# Patient Record
Sex: MALE | Race: BLACK OR AFRICAN AMERICAN | NOT HISPANIC OR LATINO | Employment: OTHER | URBAN - METROPOLITAN AREA
[De-identification: names, ages, dates, MRNs, and addresses within clinical notes are randomized per-mention and may not be internally consistent; named-entity substitution may affect disease eponyms.]

---

## 2017-01-16 ENCOUNTER — LAB VISIT (OUTPATIENT)
Dept: LAB | Facility: HOSPITAL | Age: 47
End: 2017-01-16
Payer: MEDICARE

## 2017-01-16 DIAGNOSIS — Z76.82 AWAITING ORGAN TRANSPLANT STATUS: ICD-10-CM

## 2017-01-16 PROCEDURE — 86829 HLA CLASS I/II ANTIBODY QUAL: CPT | Mod: 91,PO

## 2017-01-16 PROCEDURE — 86829 HLA CLASS I/II ANTIBODY QUAL: CPT | Mod: PO

## 2017-01-21 LAB — HPRA INTERPRETATION: NORMAL

## 2017-02-13 ENCOUNTER — LAB VISIT (OUTPATIENT)
Dept: LAB | Facility: HOSPITAL | Age: 47
End: 2017-02-13
Payer: MEDICARE

## 2017-02-13 DIAGNOSIS — Z76.82 AWAITING ORGAN TRANSPLANT STATUS: ICD-10-CM

## 2017-02-13 PROCEDURE — 86829 HLA CLASS I/II ANTIBODY QUAL: CPT | Mod: 91,PO

## 2017-02-13 PROCEDURE — 86829 HLA CLASS I/II ANTIBODY QUAL: CPT | Mod: PO

## 2017-02-23 LAB — HPRA INTERPRETATION: NORMAL

## 2017-03-21 PROCEDURE — 86832 HLA CLASS I HIGH DEFIN QUAL: CPT | Mod: PO

## 2017-03-27 LAB
CLASS I ANTIBODIES - LUMINEX: NORMAL
CPRA %: 0
SERUM COLLECTION DT - LUMINEX CLASS I: NORMAL
SPCL1 TESTING DATE: NORMAL

## 2017-04-30 DIAGNOSIS — Z76.82 ORGAN TRANSPLANT CANDIDATE: Primary | ICD-10-CM

## 2017-04-30 NOTE — PROGRESS NOTES
YEARLY LIST MANAGEMENT NOTE    Brett Frye's kidney transplant listing status reviewed.  Patient is due for follow-up appointments in June 2017.   Appointments will be scheduled per protocol.  HLA sample is past due at this time with sample last being received on March 8, 2017

## 2017-06-22 ENCOUNTER — LAB VISIT (OUTPATIENT)
Dept: LAB | Facility: HOSPITAL | Age: 47
End: 2017-06-22
Payer: MEDICARE

## 2017-06-22 DIAGNOSIS — Z76.82 AWAITING ORGAN TRANSPLANT STATUS: ICD-10-CM

## 2017-06-22 PROCEDURE — 86829 HLA CLASS I/II ANTIBODY QUAL: CPT | Mod: PO,TXP

## 2017-06-22 PROCEDURE — 86829 HLA CLASS I/II ANTIBODY QUAL: CPT | Mod: 91,PO,TXP

## 2017-07-11 NOTE — LETTER
IMPORTANT      July 12, 2017    Brett Frye JrJorge Luis  [address] [address]     Re: [id number]    Dear Mr/Mrs Frye,    Thank you for choosing [facility] as your health care provider.  We are committed to assisting you with timely insurance filing and payment of your account.  To protect your liability, updated insurance information must be given to us at the time of service and we should be notified immediately if      · Your insurance benefits/plan changes.   You become eligible for any other benefits   Your current plan/coverage terms.    Also, please bring in a copy of your insurance premium payment if you have one of the following types of insurance:    · Coverage from a correction plan.  · Coverage from the Affordable Healthcare Act Plan.  · Coverage from a COBRA plan  · Premium paid by the National Kidney Foundation.    To ensure we have the correct insurance (Medical/Pharmacy) on file and to answer any questions regarding your benefits, please call us at [phone] or [phone] and ask to speak to the kidney  indicated below:      Transplant Dept  Cain Solis   Heart   Lilibeth Brunsonmigeronimo   Kidney (A-K) and Lung  Andrewfiona Chinchillacaesar    Kidney (L-Z)  Tamiko Oconnell   Liver    We look forward to hearing from you soon.    Sincerely,      Transplant Financial Services

## 2017-07-11 NOTE — Clinical Note
July 11, 2017        Brett Frye  [address] [address]         Dear Brett Frye:    As part of our commitment to share important information with our transplant patients, we want to provide you with the most current kidney and kidney/pancreas transplant outcomes at the [facility] as published bi-annually by the Scientific Registry for Organ Recipients (SRTR).    For kidney transplants performed between 1/1/2014 and 6/30/2016 the 1-year patient and graft survival rates are as follows:   Kidney-Cadaveric Donor Kidney-Living Donor Kidney/Pancreas   Adults [facility] 1-Year Expected 1-Year National 1-Year [facility] 1-Year Expected 1-Year National 1-Year [facility] 1-Year Expected 1-Year National 1-Year   Graft Survival 93.09% 94.31% 93.92% 97.83% 97.54% 97.75% 98.18% 96.73% 96.15%   Patient Survival 96.56% 96.91% 96.57% 100% 98.88% 98.83% 98.18% 97.52% 97.54%         To learn more about transplant outcomes in the [place] you can go to www.srtr.org.     Please call the Kidney Transplant Office at [phone] or [phone] should you have any questions or if:     Your insurance changes in any way   Your address or phone number changes   There are changes in your health   You are in the hospital or Emergency Room for any reason      Sincerely,  Kidney Transplant Team

## 2017-07-12 NOTE — ADDENDUM NOTE
Encounter addended by: Sho Sevilla on: 7/12/2017  3:38 PM<BR>    Actions taken: Letter status changed

## 2017-08-01 DIAGNOSIS — Z76.82 ORGAN TRANSPLANT CANDIDATE: ICD-10-CM

## 2017-08-03 LAB — HPRA INTERPRETATION: NORMAL

## 2017-08-17 ENCOUNTER — LAB VISIT (OUTPATIENT)
Dept: LAB | Facility: HOSPITAL | Age: 47
End: 2017-08-17
Payer: MEDICARE

## 2017-08-17 DIAGNOSIS — Z76.82 AWAITING ORGAN TRANSPLANT STATUS: ICD-10-CM

## 2017-08-17 PROCEDURE — 86832 HLA CLASS I HIGH DEFIN QUAL: CPT | Mod: PO,TXP

## 2017-08-17 PROCEDURE — 86833 HLA CLASS II HIGH DEFIN QUAL: CPT | Mod: PO,TXP

## 2017-09-11 LAB — HPRA INTERPRETATION: NORMAL

## 2017-09-19 LAB
CLASS I ANTIBODIES - LUMINEX: NEGATIVE
CLASS II ANTIBODIES - LUMINEX: NEGATIVE
CPRA %: 0
SERUM COLLECTION DT - LUMINEX CLASS I: NORMAL
SERUM COLLECTION DT - LUMINEX CLASS II: NORMAL
SPCL1 TESTING DATE: NORMAL
SPCL2 TESTING DATE: NORMAL
SPCLU TESTING DATE: NORMAL

## 2017-09-21 PROBLEM — N18.6 ESRD (END STAGE RENAL DISEASE): Status: ACTIVE | Noted: 2017-09-21

## 2017-11-02 ENCOUNTER — HOSPITAL ENCOUNTER (OUTPATIENT)
Dept: RADIOLOGY | Facility: HOSPITAL | Age: 47
Discharge: HOME | End: 2017-11-02
Attending: INTERNAL MEDICINE
Payer: MEDICARE

## 2017-11-02 ENCOUNTER — HOSPITAL ENCOUNTER (OUTPATIENT)
Dept: CARDIOLOGY | Facility: CLINIC | Age: 47
Discharge: HOME | End: 2017-11-02
Payer: MEDICARE

## 2017-11-02 ENCOUNTER — OFFICE VISIT (OUTPATIENT)
Dept: TRANSPLANT | Facility: CLINIC | Age: 47
End: 2017-11-02
Payer: MEDICARE

## 2017-11-02 VITALS
WEIGHT: 149.5 LBS | SYSTOLIC BLOOD PRESSURE: 151 MMHG | RESPIRATION RATE: 20 BRPM | HEIGHT: 71 IN | OXYGEN SATURATION: 100 % | DIASTOLIC BLOOD PRESSURE: 93 MMHG | BODY MASS INDEX: 20.93 KG/M2 | TEMPERATURE: 98 F | HEART RATE: 70 BPM

## 2017-11-02 DIAGNOSIS — Z99.2 ESRD ON PERITONEAL DIALYSIS: Primary | Chronic | ICD-10-CM

## 2017-11-02 DIAGNOSIS — N18.9 ANEMIA OF RENAL DISEASE: Chronic | ICD-10-CM

## 2017-11-02 DIAGNOSIS — N18.6 ESRD (END STAGE RENAL DISEASE): ICD-10-CM

## 2017-11-02 DIAGNOSIS — Q63.1 HORSESHOE KIDNEY: Chronic | ICD-10-CM

## 2017-11-02 DIAGNOSIS — Z76.82 ORGAN TRANSPLANT CANDIDATE: ICD-10-CM

## 2017-11-02 DIAGNOSIS — I10 ESSENTIAL HYPERTENSION: Chronic | ICD-10-CM

## 2017-11-02 DIAGNOSIS — N18.6 ESRD ON PERITONEAL DIALYSIS: Primary | Chronic | ICD-10-CM

## 2017-11-02 DIAGNOSIS — Z76.82 AWAITING ORGAN TRANSPLANT STATUS: ICD-10-CM

## 2017-11-02 DIAGNOSIS — D63.1 ANEMIA OF RENAL DISEASE: Chronic | ICD-10-CM

## 2017-11-02 LAB
AORTIC VALVE REGURGITATION: ABNORMAL
DIASTOLIC DYSFUNCTION: YES
ESTIMATED PA SYSTOLIC PRESSURE: 27.04
MITRAL VALVE REGURGITATION: ABNORMAL
RETIRED EF AND QEF - SEE NOTES: 40 (ref 55–65)
TRICUSPID VALVE REGURGITATION: ABNORMAL

## 2017-11-02 PROCEDURE — 99999 PR PBB SHADOW E&M-EST. PATIENT-LVL III: CPT | Mod: PBBFAC,TXP,, | Performed by: INTERNAL MEDICINE

## 2017-11-02 PROCEDURE — 93306 TTE W/DOPPLER COMPLETE: CPT | Mod: PBBFAC,TXP | Performed by: INTERNAL MEDICINE

## 2017-11-02 PROCEDURE — 76770 US EXAM ABDO BACK WALL COMP: CPT | Mod: TC,TXP

## 2017-11-02 PROCEDURE — 99213 OFFICE O/P EST LOW 20 MIN: CPT | Mod: PBBFAC,25,TXP | Performed by: INTERNAL MEDICINE

## 2017-11-02 PROCEDURE — 99215 OFFICE O/P EST HI 40 MIN: CPT | Mod: S$PBB,TXP,, | Performed by: INTERNAL MEDICINE

## 2017-11-02 PROCEDURE — 76770 US EXAM ABDO BACK WALL COMP: CPT | Mod: 26,GC,TXP, | Performed by: RADIOLOGY

## 2017-11-02 NOTE — LETTER
November 2, 2017        Susie Patel  [address] [address]  Phone: [phone]  Fax: [phone]             [facility]  [address]  [facility] [address]  Phone: [phone]   Patient: Brett Frye Jr.   MR Number: [id number]   YOB: 1970   Date of Visit: 11/2/2017       Dear Dr. Susie Patel    Thank you for referring Brett Frye to me for evaluation. Attached you will find relevant portions of my assessment and plan of care.    If you have questions, please do not hesitate to call me. I look forward to following Brett Frye along with you.    Sincerely,    Gina Nixon MD    Enclosure    If you would like to receive this communication electronically, please contact [email or web address] or [phone] to request [employer] Link access.    [employer] Link is a tool which provides read-only access to select patient information with whom you have a relationship. Its easy to use and provides real time access to review your patients record including encounter summaries, notes, results, and demographic information.    If you feel you have received this communication in error or would no longer like to receive these types of communications, please e-mail [email or web address]

## 2017-11-02 NOTE — PROGRESS NOTES
This low EF is a change compared with last year. Not to be called for kidney transplant until this is addressed with cardiology and further evaluation takes place.

## 2017-11-02 NOTE — LETTER
Date: 11/13/2017          Listed Patient      To: Dialysis Unit  and Charge RN From:Tiffanie Vigil LCSW    RE: Brett Uday Roman, 1970, [id number]     At [facility], we conduct adherence checks as an important part of transplant care. Initial and listed patient assessments are not complete without adherence information.        Please complete the following information:     Current Dry Weight: ___________         Most Recent Pre-Treatment Weight: ___________ /date: _________                    Data from the last 1-3 months:  (data from last 3 months preferred):    Number of AMAs with dates, time, and reasons: ____________________________________________________    ______________________________________________________________________________________________    ______________________________________________________________________________________________    Number of No-Shows with dates and reasons: ______________________________________________________      ______________________________________________________________________________________________    Last intact PTH:  ___________/date: __________      Any concerns with Labs:  YES / NO      If yes, please explain:  ___________________________________________________________________________    ______________________________________________________________________________________________    Any concerns with Caregivers:  YES / NO    If yes, please explain:  ___________________________________________________________________________    ______________________________________________________________________________________________     Any concerns with Transportation:  YES / NO    If yes, please explain:  ___________________________________________________________________________    ______________________________________________________________________________________________    Any Psychiatric and/or Psychosocial concerns:   YES / NO     If yes, please explain: ___________________________________________________________________________    ______________________________________________________________________________________________      PLEASE RETURN TO: FAX: [phone]     Thank you for collaborating with us in the care of this patient.           [address]  ?  [place] [address]  ?  phone [phone]  ?  fax [phone]  ?  [email or web address]  Confidentiality notice: The accompanying facsimile is intended solely for the use of the recipient designated above. Document(s) transmitted herewith may contain information that is confidential and privileged. Delivery, distribution or dissemination of this communication other than to the intended recipient is strictly prohibited. If you have received this facsimile in error, please notify us immediately by telephone.

## 2017-11-02 NOTE — PATIENT INSTRUCTIONS
1. We will put you on hold for now; until we can get your heart checked out   2. Try to increase your activity   3. Have any potential donors contact the living donor coordinator

## 2017-11-02 NOTE — PROGRESS NOTES
Kidney Transplant Recipient Reevalulation    Referring Physician: Susie Patel  Current Nephrologist: Susie Patel  Waitlist Status: active  Dialysis Start Date: 10/3/2017    Subjective:     CC:  Annual reassessment of kidney transplant candidacy.    HPI:  Mr. Frye is a 47 y.o. 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 Patient was on peritoneal dialysis from 6/14/12 until Sept 2017 - states that he wasn't getting adequate dialysis. He had peritonitis a week after his last transplant clinic visit which was his initial RR clinic visit on 2/16/16. He has been on the wait list for a kidney transplant at [facility] since 6/14/2012. Patient is currently on hemodialysis started on Sept 2017. Patient is dialyzing on TTS schedule.  Patient reports that he is tolerating dialysis well.. He has a dialysis catheter. Patient denies any recent hospitalizations or ED visits. He states home BPs are mostly in the 150/90s.     He will walk to his mother's house which is half a mile away every 2-3 days.     He had TTE today which showed a decrease in LVEF from 55% (6/27/16) --> 40% today. Discussed with patient how we will need him to see cardiology and will place him on hold on the waitlist until this is further evaluated.     Review of Systems   Constitutional: Denies fever/chills, fatigue, night sweats  EENT: Denies vision problems, hearing problems, trouble swallowing.   Respiratory: Denies shortness of breath, dyspnea on exertion, orthopnea, wheezing, hemoptysis, denies known TB exposure or history of positive TB skin test  Cardiovascular: Denies chest pain, palpitations, history of MI, history of stroke, history of DVT  Gastrointestinal: Denies abdominal pain, nausea/vomiting/diarrhea/constipation. Denies history of GI bleeding or ulcers.   Genitourinary: +residual UOP ~2 cups/day; Denies history of kidney stones, recurrent UTI's, history of urinary obstruction,  hematuria, dysuria, urinary frequency, incontinence  Musculoskeletal: Denies trouble moving extremities, denies joint pain or swelling  Skin: Denies history of skin cancer, denies rash, ulcerations  Heme/onc: Denies any history of cancer, denies history of coagulopathies or bleeding disorders  Endocrine: +gained 8 lbs since Feb 2016 transplant clinic visit; Denies thyroid disease  Neurological: Denies tremors, seizures, dizziness, headaches, peripheral neuropathy  Psychiatric: Denies anxiety, depression. Denies hallucinations or delusions.      Medications:   Current Outpatient Prescriptions   Medication Sig Dispense Refill    allopurinol (ZYLOPRIM) 100 MG tablet Take 100 mg by mouth once daily.      amlodipine (NORVASC) 10 MG tablet Take 10 mg by mouth once daily.      atorvastatin (LIPITOR) 10 MG tablet Take 10 mg by mouth every evening.       B complex-vitamin C-folic acid 0.8 mg Tab Take 1 tablet by mouth once daily.      calcitRIOL (ROCALTROL) 0.25 MCG Cap Take 0.25 mcg by mouth once daily.      carvedilol (COREG) 6.25 MG tablet Take 6.25 mg by mouth 2 (two) times daily.      doxazosin (CARDURA) 1 MG tablet Take 1 mg by mouth once daily.       FERRIC CITRATE (AURYXIA ORAL) Take 2 tablets by mouth 3 (three) times daily.      ferric citrate 210 mg iron Tab Take 210 mg by mouth 3 (three) times daily.      furosemide (LASIX) 80 MG tablet Take 80 mg by mouth once daily.      hydrALAZINE (APRESOLINE) 25 MG tablet Take 25 mg by mouth 3 (three) times daily.      hydrocodone-acetaminophen 5-325mg (NORCO) 5-325 mg per tablet Take 1 tablet by mouth every 6 (six) hours as needed for Pain. 15 tablet 0    isosorbide dinitrate (ISORDIL) 20 MG tablet Take 20 mg by mouth 3 (three) times daily.      k phos di & mono-sod phos mono (K-PHOS-NEUTRAL) 250 mg Tab Take 500 mg by mouth 3 (three) times daily after meals.      lanthanum (FOSRENOL) 1000 MG chewable tablet Take 1,000 mg by mouth 3 (three) times daily.       "multivitamin with minerals tablet Take 1 tablet by mouth once daily.      sevelamer carbonate (RENVELA) 800 mg Tab Take 800 mg by mouth 3 (three) times daily with meals.      sodium bicarbonate 650 MG tablet Take 650 mg by mouth 2 (two) times daily.       No current facility-administered medications for this visit.      *not taking Norco - prescribed after PD catheter placed  *doesn't take Auryxia or KPN    Objective:   body mass index is 21.14 kg/m².   BP (!) 151/93 (BP Location: Right arm, Patient Position: Sitting, BP Method: Medium (Automatic))   Pulse 70   Temp 97.8 °F (36.6 °C) (Oral)   Resp 20   Ht 5' 10.5" (1.791 m)   Wt 67.8 kg (149 lb 7.6 oz)   SpO2 100%   BMI 21.14 kg/m²       Physical Exam   General: No acute distress, well groomed, alert and oriented x 3  HEENT: Normocephalic, atraumatic, PERRLA, sclera anicteric, conjunctiva/corneas clear, EOM's intact bilaterally, external inspection of ears and nose normal, moist mucous membranes, no oral ulcerations/lesions; multiple missing teeth; some areas concerning for decay  Neck: Supple, symmetrical, trachea midline, no masses, no carotid bruits, no JVD, thyroid is normal without nodules or enlargement   Respiratory: Clear to auscultation bilaterally, respirations unlabored, no rales/rhonchi/wheezing   Cardiovacular: Regular rate and rhythm, +murmur, no rubs or gallops   Gastrointestinal: Soft, non-tender, bowel sounds normal, no masses, no palpable organomegaly  Extremities: No clubbing or cyanosis of upper extremities bilaterally, no pedal edema bilaterally; +2 bilateral radial pulses  Skin: warm and dry; no rash on exposed skin  Lymph nodes: ?left cervical lymphadenopathy; supraclavicular nodes normal   Neurologic: No focal neurologic deficits.   Musculoskeletal: moves all extremities without difficulty, FROM, 5/5 strength, ambulates without an assistive device  Psychiatric: Normal mood and affect. Responds appropriately to " questions.      Labs:  Lab Results   Component Value Date    WBC 9.58 02/16/2016    HGB 12.1 (L) 09/21/2017    HCT 39.1 (L) 02/16/2016     09/21/2017    K 4.9 09/21/2017     09/21/2017    CO2 27 09/21/2017    BUN 22 (H) 09/21/2017    CREATININE 7.77 (H) 09/21/2017    EGFRNONAA 8 (A) 09/21/2017    CALCIUM 9.5 09/21/2017    PHOS 8.0 (H) 02/16/2016    ALBUMIN 3.7 02/16/2016    AST 11 02/16/2016    ALT 13 02/16/2016    .0 (H) 06/27/2016       No results found for: PREALBUMIN, BILIRUBINUA, GGT, AMYLASE, LIPASE, PROTEINUA, NITRITE, RBCUA, WBCUA    No results found for: HLAABCTYPE    Lab Results   Component Value Date    CPRA 0 08/11/2017    FP7RKEK Negative 08/11/2017    CIABCLM WEAK B82, A80 09/04/2016    CIIAB Negative 08/11/2017       Labs were reviewed with the patient.    Pre-transplant Workup:   Reviewed with the patient.    Assessment:     1. ESRD on dialysis since 6/14/12    2. Anemia of renal disease    3. Essential hypertension    4. Horseshoe kidney    5. ESRD (end stage renal disease)    6. Awaiting organ transplant status        Plan:     Transplant Candidacy:   Mr. Frye is an unacceptable kidney transplant candidate at this time given decrease in LVEF which warrants cardiology evaluation.  He will be placed in an inactive status at present due to drop in LVEF. . Need to refer to cardiology for consult.     Exercise: reminded Brett of the importance of regular exercise for weight management, blood sugar and blood pressure management.  I also explained exercise has been shown to improve cardiovascular health, energy level, and sleep hygiene.  Lastly, I advised him that cardiovascular complications are leading cause of death and regular exercise can help lower this risk.      Gina Nixon MD       Follow-up:   In addition to the tests noted in the plan, Mr. Frye will continue to have reevaluation as per the standing pre-kidney transplant protocol:  1. Monthly blood for PRA  2. Annual  return to clinic, except HIV positive, > 65 years of age, or pancreas transplant candidates who will be scheduled to see transplant every 6 months while in pre-transplant phase  3. Annual re-testing: CXR, EKG, yearly mammograms for women over 40 and PSA for males over 40, cardiology follow-up as recommended by initial cardiology pre-transplant evaluation  4. Renal ultrasound every 2 years  5. Baseline colonoscopy after age 50 and repeated as recommended    UNOS Patient Status  Functional Status: 60% - Requires occasional assistance but is able to care for needs  Physical Capacity: No Limitations

## 2017-11-03 NOTE — PROGRESS NOTES
Pt status changed to inactive due to decreased EF, needs cards eval & clr. Status changed in [facility] & [place]. Letter request in [facility].

## 2017-11-03 NOTE — LETTER
November 3, 2017          Brett Frye  [address] [address]          Dear Brettferoz Frye:  MRN: [id number]    The [facility] team reviewed your transplant candidacy.  It is our programs opinion that you are temporarily not a transplant candidate at our facility as of 11/3/17 because of decreased ejection fraction requiring Cardiology evaluation.  You will remain listed on the wait list, but will not be able to receive a transplant until this issue is resolved.      Attached is a letter from the United Network for Organ Sharing (UNOS).  It describes the services and information offered to patients by UNOS and the Organ Procurement and Transplant Network.    The [facility] team remains available to answer any questions.  Should you have any questions regarding this decision, please do not hesitate to contact our pre-transplant office.      Sincerely,        Halle Billy MD  Medical Director, Kidney & Kidney/Pancreas Transplantation  lh  Mailed to patient    Faxed to:  Susie Patel MD  [facility]              OPTN/UNOS: Your Resource for Organ Transplant Information        If you have a question regarding your own medical care, you always should call your transplant center first. However, for general organ transplant-related information, you can call the United Network for Organ Sharing (UNOS) toll-free patient services line at [phone].    Anyone, including potential transplant candidates, recipients, family members/friends, living donors, and/or donor family members can call this number to:    · talk about organ donation, living donation, how transplant and donation work, the donation process, transplant policies, and transplant/donor information;  · get a free patient information kit with helpful booklets, waiting list and transplant information, and a list of all transplant centers;  · ask questions about the Organ Procurement and  Transplantation Network (OPTN) web site (www.optn.transplant.hrsa.gov); the UNOS Web site (www.unos.org); or the UNOS web site for living donors and transplant recipients (www.transplantliving.org);  · learn how UNOS and the OPTN can help you;  · talk about any concerns that you may have with a transplant center and how they perform    [facility] is a not-for-profit organization that provides all of the administrative services for the national OPTN under federal contract to the Health Resources and Services Administration (HRSA), an agency under the [place].S. Department of Health and Human Services (HHS).     UNOS and OPTN responsibilities include:    · writing educational material for patients, the public and professionals;  · helping to make people aware of the need for donated organs and tissue;  · writing organ transplant policy with help from doctors, nurses, transplant patients/candidates, donor families, living donors, and the public;  · coordinating the organ matching and placement process;  · collecting information about every organ transplant and donation that occurs in the [place].    Remember, you should contact your transplant center directly if you have questions or concerns about your own medical care including medical records, work-up progress and test reports. [facility] is not your transplant center, and staff at [facility] will not be able to transfer you to your transplant center, so keep your transplant centers phone number handy. But, while you research your transplant needs and learn as much as you can about transplantation and donation, we welcome your call to our toll-free patient services line at [phone].

## 2017-11-13 NOTE — PROGRESS NOTES
Transplant Recipient Adult Psychosocial Assessment (Last Assessment completed on 02/23/2016)    Brett Frye  [address] [facility] [address]    Telephone Information:   Mobile [phone]   Home  [phone] (home)  Work  There is no work phone number on file.  E-mail  No e-mail address on record    Sex: male  YOB: 1970  Age: 47 y.o.    Encounter Date: 11/2/2017  [place] Citizen: yes  Primary Language: English   Needed: no    Emergency Contact:  Name: Halle Frye  Relationship: mother  Address: Same as pt  Phone Numbers:  [phone] (home), [phone] (mobile)    Name: Arjun Grant  Relationship: significant other   Address: Same as pt.  Phone Numbers:  [phone] (mobile)    Family/Social Support:   Number of dependents/: Pt reports no children of his own. Pt reports pt's significant other has 2 minor children. Pt reports mother and significant other will rotate with caring for pt and children.  Marital history: Pt reports being currently  from wife: Lilia. Pt reports they were  in 1995 and have been  since 2013/2014. Pt reports current significant other as: Dewayne Grant and reports they have been together since 2013/2014.  Other family dynamics: Pt reports parents: Brett and Halle Frye and 1 sister: Ryley. Pt identifies all family members as supportive.    Household Composition:  Name: Brett Frye Jr.  Age: 47  Relationship: patient  Does person drive? yes    Name: Arjun Grant  Age: 36  Relationship: significant other  Does person drive? yes    Name: Radha Morris and Sunshine Grant  Age: 13 and 7 (respectively)  Relationship: significant other's children  Does person drive? yes    Do you and your caregivers have access to reliable transportation? yes  PRIMARY CAREGIVER: Dewayne (pronunced: Car-edwin-thee-a) Rudy (significant other) will be primary caregiver, phone number [phone].       provided in-depth information to patient and caregiver regarding pre- and post-transplant caregiver role.   strongly encourages patient and caregiver to have concrete plan regarding post-transplant care giving, including back-up caregiver(s) to ensure care giving needs are met as needed.    Patient and Caregiver states understanding all aspects of caregiver role/commitment and is able/willing/committed to being caregiver to the fullest extent necessary.    Patient and Caregiver verbalizes understanding of the education provided today and caregiver responsibilities.         remains available. Patient agree to contact  in a timely manner if concerns arise.      Able to take time off work without financial concerns: yes.     Natty reports that her children will be cared for by her mother: Karen Grant (62), [phone], lives in [place], [place], drives    Additional Significant Others who will Assist with Transplant:  Name: Halle Barker  Age: 65  Phone: [phone]  City: [place] State: [place]  Relationship: mother  Does person drive? yes    Living Will: no Education and information provided to pt. SW also strongly encouraged pt to complete forms due to pt's current marital status.   Healthcare Power of : no Education and information provided to pt. SW also strongly encouraged pt to complete forms due to pt's current marital status.     Patient and Caregiver verbalized understanding and agreement.     Advance Directives on file: <<no information> per medical record.  Verbally reviewed LW/HCPA information.   provided patient with copy of LW/HCPA documents and provided education on completion of forms.    Living Donors: Yes.  Name: sister: Ryley Charles or significant other: Arjun Grant. Education and resource information given to patient. SW explained to Patient and Caregiver that both recipients and donors require separate caregivers. Patient  and Caregiver verbalized understanding and agreement.     Highest Education Level: High School (9-12) or GED  Reading Ability: 12th grade  Reports difficulty with: seeing and wears reading glasses  Learns Best By:  Pt reports pt learns best by a combination of verbal, written, and tactile instructions.     Status: no  VA Benefits: no     Working for Income: No  If no, reason not working: Disability  Patient is disabled.  Prior to disability, patient  was employed as a  for [employer] in ..    Spouse/Significant Other Employment: Pt reports Dewayne does not currently work.    Disabled: yes: date disability began: 2015, due to: ESRD.    Monthly Income:  SS Disability: $1200     Able to afford all costs now and if transplanted, including medications: yes Pt reports that he has raised $4000 from doing suppers. Pt also plans to return to work.   Patient and Caregiver verbalizes understanding of personal responsibilities related to transplant costs and the importance of having a financial plan to ensure that patients transplant costs are fully covered.       provided fundraising information/education. Patient and Caregiververbalizes understanding.   remains available.    Insurance:   Payor/Plan Subscr  Sex Relation Sub. Ins. ID Effective Group Num   1. MEDICARE - [place]* JORDYN LOPEZ . 1970 Male  [id number]                                    [address]     Primary Insurance (for UNOS reporting): Public Insurance - Medicare FFS (Fee For Service)  Secondary Insurance (for UNOS reporting): None Pt does have a Part D with [employer]  Patient and Caregiver verbalizes clear understanding that patient may experience difficulty obtaining and/or be denied insurance coverage post-surgery. This includes and is not limited to disability insurance, life insurance, health insurance, burial insurance, long term care insurance, and other  insurances.      Patient and Caregiver also reports understanding that future health concerns related to or unrelated to transplantation may not be covered by patient's insurance.  Resources and information provided and reviewed.     Patient and Caregiver provides verbal permission to release any necessary information to outside resources for patient care and discharge planning.  Resources and information provided are reviewed.      Dialysis Adherence: Patient reports being on hemodialysis in center attending all dialysis appointments and staying for the entire course of treatment. SW faxed an adherence form (see Letters section) and is awaiting a fax back.       Infusion Service: patient utilizing? no  Home Health: patient utilizing? no  DME: yes BP Cuff  Pulmonary/Cardiac Rehab: Pt denies   ADLS:  Pt reports no difficulties with driving, walking, bathing, cooking, housekeeping, eating, shopping, and taking medication.    Adherence:   Pt reports good adherence with medications, dialysis, and health regimen.  Adherence education and counseling provided.     Per History Section:  Past Medical History:   Diagnosis Date    Anemia of renal disease     ESRD on dialysis since 6/14/12     Essential hypertension     Horseshoe kidney     Secondary hyperparathyroidism of renal origin      Social History   Substance Use Topics    Smoking status: Current Every Day Smoker     Packs/day: 1.00     Types: Cigarettes    Smokeless tobacco: Never Used      Comment: started smoking at age 20; at the most smoked 1 ppd; currently smoking 10 or less cig/day    Alcohol use No      Comment: previously would drink a 12 pack of beer on the weekends; quit 2 years ago     History   Drug use: Unknown     Comment: remote marijuana use in his 20s     History   Sexual Activity    Sexual activity: Not on file       Per Today's Psychosocial:  Tobacco: Pt reports he currently smokes about 0.5 ppd. Pt reports that he has smoked up to 1ppd. Pt  reports he has smoked for 25 years..  Alcohol: Pt reports that he used to drink about a 12 pack on the weekend and reports quitting in 2014..  Illicit Drugs/Non-prescribed Medications: Pt reports a remote history of marijuana use in his 20s. Pt reports no current use..    Patient and Caregiver states clear understanding of the potential impact of substance use as it relates to transplant candidacy and is aware of possible random substance screening.  Substance abstinence/cessation counseling, education and resources provided and reviewed.     Arrests/DWI: Pt reports being arrested once in his 20s for a DWI traffic ticket.  Treatment/Rehab: patient denies    Psychiatric History:    Mental Health: Pt reports no history of or current mental health issues or concerns.   Psychiatrist/Counselor: Pt denies seeing a mental health professional and reports being open to seeing the psych department for talk therapy if necessary.  Medications:  Pt denies taking medications for mental health reasons.  Suicide/Homicide Issues: Pt denies any history of or current suicidal or homicidal ideations.    Safety at home: Pt reports no current or history of safety concerns in household.    Knowledge: Patient and Caregiver states having clear understanding and realistic expectations regarding the potential risks and potential benefits of organ transplantation and organ donation and agrees to discuss with health care team members and support system members, as well as to utilize available resources and express questions and/or concerns in order to further facilitate the pt informed decision-making.  Resources and information provided and reviewed.    Patient and Caregiver is aware of [facility]'s affiliation and/or partnership with agencies in home health care, LTAC, SNF, [facility], and other hospitals and clinics.    Understanding: Patient and Caregiver reports having a clear understanding of the many lifetime commitments involved with being a  transplant recipient, including costs, compliance, medications, lab work, procedures, appointments, concrete and financial planning, preparedness, timely and appropriate communication of concerns, abstinence (ETOH, tobacco, illicit non-prescribed drugs), adherence to all health care team recommendations, support system and caregiver involvement, appropriate and timely resource utilization and follow-through, mental health counseling as needed/recommended, and patient and caregiver responsibilities.  Social Service Handbook, resources and detailed educational information provided and reviewed.  Educational information provided.    Patient and Caregiver also reports current and expected compliance with health care regime and states having a clear understanding of the importance of compliance.      Patient and Caregiver reports a clear understanding that risks and benefits may be involved with organ transplantation and with organ donation.       Patient and Caregiver also reports clear understanding that psychosocial risk factors may affect patient, and include but are not limited to feelings of depression, generalized anxiety, anxiety regarding dependence on others, post traumatic stress disorder, feelings of guilt and other emotional and/or mental concerns, and/or exacerbation of existing mental health concerns.  Detailed resources provided and discussed.      Patient and Caregiver agrees to access appropriate resources in a timely manner as needed and/or as recommended, and to communicate concerns appropriately.  Patient and Caregiver also reports a clear understanding of treatment options available.     Patient and Caregiver received education in a group setting.   reviewed education, provided additional information, and answered questions.    Feelings or Concerns: Patient and Caregiver did not express any concerns at this time.    Coping: Pt reports coping well with the transplant process at this time  "and reports spending time with parents, taking a walk, and playing his Hybrid [employer] 360 as ways to cope.    Goals: Pt reports going back to work, "hoping this kidney lasts a long time" and "starting my life back over if the good Lord says the same" as goals for after transplant.  Patient referred to Vocational Rehabilitation.    Interview Behavior: Patient and Caregiver presents as alert and oriented x 4, pleasant, good eye contact, well groomed, recall good, concentration/judgement good, average intelligence, calm, communicative, cooperative and asking and answering questions appropriately. Pt presents with significant other: Sanjay Grant at pt's request.         Transplant Social Work - Candidacy  Assessment/Plan:     Psychosocial Suitability: Patient presents as a suitable candidate for kidney transplant at this time. Based on psychosocial risk factors, patient presents as low risk, due to adequate financial plan, suitable dialysis adherence, and caregiver plan in place..    Recommendations/Additional Comments: SW recommends that pt conduct fundraising to assist pt with pay for cost of medications, food, gas, and other transplant related needs. SW recommends that pt remain aware of potential mental health concerns and contact the team if any concerns arise. SW recommends that pt remain abstinent from tobacco, ETOH, and drug use. SW supports pt's continued dialysis adherence. SW remains available to answer any questions or concerns that arise as the pt moves through the transplant process.     Tiffanie Vigil LCSW         "

## 2017-11-14 ENCOUNTER — LAB VISIT (OUTPATIENT)
Dept: LAB | Facility: HOSPITAL | Age: 47
End: 2017-11-14
Payer: MEDICARE

## 2017-11-14 DIAGNOSIS — Z76.82 AWAITING ORGAN TRANSPLANT STATUS: ICD-10-CM

## 2017-11-14 PROCEDURE — 86829 HLA CLASS I/II ANTIBODY QUAL: CPT | Mod: 91,PO,TXP

## 2017-11-14 PROCEDURE — 86829 HLA CLASS I/II ANTIBODY QUAL: CPT | Mod: PO,TXP

## 2017-11-30 LAB — HPRA INTERPRETATION: NORMAL

## 2018-01-03 ENCOUNTER — OFFICE VISIT (OUTPATIENT)
Dept: CARDIOLOGY | Facility: CLINIC | Age: 48
End: 2018-01-03
Payer: MEDICARE

## 2018-01-03 VITALS
WEIGHT: 146.19 LBS | DIASTOLIC BLOOD PRESSURE: 80 MMHG | HEIGHT: 70 IN | BODY MASS INDEX: 20.93 KG/M2 | HEART RATE: 70 BPM | SYSTOLIC BLOOD PRESSURE: 120 MMHG

## 2018-01-03 DIAGNOSIS — I10 ESSENTIAL HYPERTENSION: Primary | Chronic | ICD-10-CM

## 2018-01-03 DIAGNOSIS — I42.0 DILATED CARDIOMYOPATHY: ICD-10-CM

## 2018-01-03 DIAGNOSIS — Z99.2 ESRD ON PERITONEAL DIALYSIS: Chronic | ICD-10-CM

## 2018-01-03 DIAGNOSIS — N18.6 ESRD ON PERITONEAL DIALYSIS: Chronic | ICD-10-CM

## 2018-01-03 DIAGNOSIS — N18.6 ESRD (END STAGE RENAL DISEASE): ICD-10-CM

## 2018-01-03 DIAGNOSIS — Q63.1 HORSESHOE KIDNEY: Chronic | ICD-10-CM

## 2018-01-03 DIAGNOSIS — Z01.818 PRE-TRANSPLANT EVALUATION FOR CHRONIC KIDNEY DISEASE: ICD-10-CM

## 2018-01-03 PROCEDURE — 99213 OFFICE O/P EST LOW 20 MIN: CPT | Mod: PBBFAC,TXP | Performed by: INTERNAL MEDICINE

## 2018-01-03 PROCEDURE — 99204 OFFICE O/P NEW MOD 45 MIN: CPT | Mod: S$PBB,TXP,, | Performed by: INTERNAL MEDICINE

## 2018-01-03 PROCEDURE — 99999 PR PBB SHADOW E&M-EST. PATIENT-LVL III: CPT | Mod: PBBFAC,TXP,, | Performed by: INTERNAL MEDICINE

## 2018-01-03 RX ORDER — RAMIPRIL 2.5 MG/1
2.5 CAPSULE ORAL DAILY
COMMUNITY
End: 2018-07-12

## 2018-01-03 NOTE — PROGRESS NOTES
Subjective:   Patient ID:  Brett Frye Jr. is a 47 y.o. male who presents for evaluation of Pre-op Exam      HPI: He has a history of ESRD since 2012 due to HTN and horse shoe kidney. He had been on peritoneal dialysis until this past summer. As part of his annual transplant evaluation, an echo was done which showed a drop in his LVEF from 55-60% to 40-45% associated with global hypokinesis. His LVEDD was enlarged at 5.9 cm. RV function was normal. He denies any cardiac history. Brett Frye Jr. denies any chest pain, shortness of breath, PND, orthopnea, palpitations, leg edema, or syncope. He is not physically active. He smokes cigarettes. He had a DSE done in 2016 which was negative for ischemia.    ECG (6/16): NSR 87 bpm.    Past Medical History:   Diagnosis Date    Anemia of renal disease     ESRD on dialysis since 6/14/12     Essential hypertension     Horseshoe kidney     Hyperlipidemia     Secondary hyperparathyroidism of renal origin        Past Surgical History:   Procedure Laterality Date    PERITONEAL CATHETER INSERTION  2012    x 2       Social History     Social History    Marital status:      Spouse name: N/A    Number of children: N/A    Years of education: N/A     Social History Main Topics    Smoking status: Current Every Day Smoker     Packs/day: 1.00     Years: 20.00     Types: Cigarettes    Smokeless tobacco: Never Used      Comment: started smoking at age 20; at the most smoked 1 ppd; currently smoking 10 or less cig/day    Alcohol use No      Comment: previously would drink a 12 pack of beer on the weekends; quit 2 years ago    Drug use: Unknown      Comment: remote marijuana use in his 20s    Sexual activity: Not Asked     Other Topics Concern    None     Social History Narrative    Lives with girlfriend who will be his caregiver    Doesn't work currently - used to be a  and        Family History   Problem Relation Age of Onset    Diabetes  Mother     Hypertension Mother     Diabetes Father     Hypertension Father     Coronary artery disease Father      MI in his early 60s    Heart attack Father     Kidney disease Neg Hx     Cancer Neg Hx        Patient's Medications   New Prescriptions    No medications on file   Previous Medications    ALLOPURINOL (ZYLOPRIM) 100 MG TABLET    Take 100 mg by mouth once daily.    AMLODIPINE (NORVASC) 10 MG TABLET    Take 10 mg by mouth once daily.    ATORVASTATIN (LIPITOR) 10 MG TABLET    Take 10 mg by mouth every evening.     B COMPLEX-VITAMIN C-FOLIC ACID 0.8 MG TAB    Take 1 tablet by mouth once daily.    CALCITRIOL (ROCALTROL) 0.25 MCG CAP    Take 0.25 mcg by mouth once daily.    CARVEDILOL (COREG) 6.25 MG TABLET    Take 6.25 mg by mouth 2 (two) times daily.    DOXAZOSIN (CARDURA) 1 MG TABLET    Take 1 mg by mouth once daily.     FERRIC CITRATE (AURYXIA ORAL)    Take 2 tablets by mouth 3 (three) times daily.    FERRIC CITRATE 210 MG IRON TAB    Take 210 mg by mouth 3 (three) times daily.    FUROSEMIDE (LASIX) 80 MG TABLET    Take 80 mg by mouth once daily.    HYDRALAZINE (APRESOLINE) 25 MG TABLET    Take 25 mg by mouth 3 (three) times daily.    HYDROCODONE-ACETAMINOPHEN 5-325MG (NORCO) 5-325 MG PER TABLET    Take 1 tablet by mouth every 6 (six) hours as needed for Pain.    ISOSORBIDE DINITRATE (ISORDIL) 20 MG TABLET    Take 20 mg by mouth 3 (three) times daily.    K PHOS DI & MONO-SOD PHOS MONO (K-PHOS-NEUTRAL) 250 MG TAB    Take 500 mg by mouth 3 (three) times daily after meals.    LANTHANUM (FOSRENOL) 1000 MG CHEWABLE TABLET    Take 1,000 mg by mouth 3 (three) times daily.    MULTIVITAMIN WITH MINERALS TABLET    Take 1 tablet by mouth once daily.    RAMIPRIL (ALTACE) 2.5 MG CAPSULE    Take 2.5 mg by mouth once daily.    SEVELAMER CARBONATE (RENVELA) 800 MG TAB    Take 800 mg by mouth 3 (three) times daily with meals.    SODIUM BICARBONATE 650 MG TABLET    Take 650 mg by mouth 2 (two) times daily.   Modified  "Medications    No medications on file   Discontinued Medications    No medications on file       Review of Systems   Constitution: Negative for weakness, malaise/fatigue and weight gain.   HENT: Negative for hearing loss.    Eyes: Negative for visual disturbance.   Cardiovascular: Negative for chest pain, claudication, dyspnea on exertion, leg swelling, near-syncope, orthopnea, palpitations, paroxysmal nocturnal dyspnea and syncope.   Respiratory: Negative for cough, shortness of breath, sleep disturbances due to breathing, snoring and wheezing.    Endocrine: Negative for cold intolerance, heat intolerance, polydipsia, polyphagia and polyuria.   Hematologic/Lymphatic: Negative for bleeding problem. Does not bruise/bleed easily.   Skin: Negative for rash and suspicious lesions.   Musculoskeletal: Negative for arthritis, falls, joint pain, muscle weakness and myalgias.   Gastrointestinal: Negative for abdominal pain, change in bowel habit, constipation, diarrhea, heartburn, hematochezia, melena and nausea.   Genitourinary: Negative for hematuria and nocturia.   Neurological: Negative for excessive daytime sleepiness, dizziness, headaches, light-headedness and loss of balance.   Psychiatric/Behavioral: Negative for depression. The patient is not nervous/anxious.    Allergic/Immunologic: Negative for environmental allergies.       /80   Pulse 70   Ht 5' 10" (1.778 m)   Wt 66.3 kg (146 lb 2.6 oz)   BMI 20.97 kg/m²     Objective:   Physical Exam   Constitutional: He is oriented to person, place, and time. He appears well-developed and well-nourished.        HENT:   Head: Normocephalic and atraumatic.   Mouth/Throat: Oropharynx is clear and moist.   Eyes: Conjunctivae and EOM are normal. Pupils are equal, round, and reactive to light. No scleral icterus.   Neck: Normal range of motion. Neck supple. No hepatojugular reflux and no JVD present. No tracheal deviation present. No thyromegaly present.   Cardiovascular: " Normal rate, regular rhythm, normal heart sounds and intact distal pulses.  PMI is not displaced.    Pulses:       Carotid pulses are 2+ on the right side, and 2+ on the left side.       Radial pulses are 2+ on the right side, and 2+ on the left side.        Dorsalis pedis pulses are 2+ on the right side, and 2+ on the left side.        Posterior tibial pulses are 2+ on the right side, and 2+ on the left side.   AV fistula with palpable thrill in right wrist    HD catheter present right chest   Pulmonary/Chest: Effort normal and breath sounds normal.   Abdominal: Soft. Bowel sounds are normal. He exhibits no distension and no mass. There is no hepatosplenomegaly. There is no tenderness.   Musculoskeletal: He exhibits no edema or tenderness.   Lymphadenopathy:     He has no cervical adenopathy.   Neurological: He is alert and oriented to person, place, and time.   Skin: Skin is warm and dry. No rash noted. No cyanosis or erythema. Nails show no clubbing.   Psychiatric: He has a normal mood and affect. His speech is normal and behavior is normal.       Lab Results   Component Value Date     09/21/2017    K 4.9 09/21/2017     09/21/2017    CO2 27 09/21/2017    BUN 22 (H) 09/21/2017    CREATININE 7.77 (H) 09/21/2017    GLU 91 09/21/2017    AST 11 02/16/2016    ALT 13 02/16/2016    ALBUMIN 3.7 02/16/2016    PROT 6.9 02/16/2016    BILITOT 0.4 02/16/2016    WBC 9.58 02/16/2016    HGB 12.1 (L) 09/21/2017    HCT 39.1 (L) 02/16/2016    MCV 99 (H) 02/16/2016     02/16/2016    INR 1.1 02/16/2016    TSH 3.48 06/09/2009    CHOL 123 02/16/2016    HDL 41 02/16/2016    LDLCALC 64.4 02/16/2016    TRIG 88 02/16/2016       Assessment:     1. Essential hypertension : Blood pressure is at goal. I have made no changes. Continue current regimen.   2. ESRD (end stage renal disease)    3. ESRD on dialysis since 6/14/12    4. Horseshoe kidney    5. Pre-transplant evaluation for chronic kidney disease    6. Dilated  cardiomyopathy : He has new drop in his LVEF. He is euvolemic. I have ordered a PET stress to rule out ischemic heart disease. He is on appropriate medical therapy with an ACE-I, beta blocker, hydralazine and nitrate therapy.       Plan:     Brett was seen today for pre-op exam.    Diagnoses and all orders for this visit:    Essential hypertension    ESRD (end stage renal disease)    ESRD on dialysis since 6/14/12    Horseshoe kidney    Pre-transplant evaluation for chronic kidney disease  -     Cardiac PET Scan Stress; Future    Dilated cardiomyopathy  -     Cardiac PET Scan Stress; Future        Thank you for allowing me to participate in this patient's care. Please do not hesitate to contact me with any questions or concerns.

## 2018-01-03 NOTE — LETTER
January 3, 2018      Halle Sanchez MD  [address]  [place] [address]           [facility]  [address]  [facility] [address]  Phone: [phone]          Patient: Brett Frye Jr.   MR Number: [id number]   YOB: 1970   Date of Visit: 1/3/2018       Dear Dr. Halle Sanchez:    Thank you for referring Brett Frye to me for evaluation. Attached you will find relevant portions of my assessment and plan of care.    If you have questions, please do not hesitate to call me. I look forward to following Brett Frye along with you.    Sincerely,    Claudette Turcios MD    Enclosure  CC:  No Recipients    If you would like to receive this communication electronically, please contact [email or web address] or [phone] to request more information on [employer] [facility] access.    For providers and/or their staff who would like to refer a patient to [facility], please contact us through our one-stop-shop provider referral line, [facility], at [phone].    If you feel you have received this communication in error or would no longer like to receive these types of communications, please e-mail [email or web address]

## 2018-02-05 ENCOUNTER — CLINICAL SUPPORT (OUTPATIENT)
Dept: CARDIOLOGY | Facility: CLINIC | Age: 48
End: 2018-02-05
Attending: INTERNAL MEDICINE
Payer: MEDICARE

## 2018-02-05 DIAGNOSIS — Z01.818 PRE-TRANSPLANT EVALUATION FOR CHRONIC KIDNEY DISEASE: ICD-10-CM

## 2018-02-05 DIAGNOSIS — I42.0 DILATED CARDIOMYOPATHY: ICD-10-CM

## 2018-02-05 LAB — DIASTOLIC DYSFUNCTION: NO

## 2018-02-05 PROCEDURE — 93016 CV STRESS TEST SUPVJ ONLY: CPT | Mod: S$PBB,TXP,, | Performed by: INTERNAL MEDICINE

## 2018-02-05 PROCEDURE — 78492 MYOCRD IMG PET MLT RST&STRS: CPT | Mod: 26,S$PBB,TXP, | Performed by: INTERNAL MEDICINE

## 2018-02-05 PROCEDURE — 93018 CV STRESS TEST I&R ONLY: CPT | Mod: S$PBB,TXP,, | Performed by: INTERNAL MEDICINE

## 2018-02-07 ENCOUNTER — TELEPHONE (OUTPATIENT)
Dept: CARDIOLOGY | Facility: CLINIC | Age: 48
End: 2018-02-07

## 2018-02-08 NOTE — TELEPHONE ENCOUNTER
Attempted to reach patient at both phones but no answer. Left message on recorder asking him to call us to call us     Notes Recorded by Claudette Turcios MD on 2/5/2018 at 11:59 AM CST  The PET stress test did not show evidence of any significant blockages in the coronary arteries.

## 2018-02-09 ENCOUNTER — TELEPHONE (OUTPATIENT)
Dept: CARDIOLOGY | Facility: CLINIC | Age: 48
End: 2018-02-09

## 2018-02-09 ENCOUNTER — NURSE TRIAGE (OUTPATIENT)
Dept: ADMINISTRATIVE | Facility: CLINIC | Age: 48
End: 2018-02-09

## 2018-02-09 NOTE — TELEPHONE ENCOUNTER
"  Reason for Disposition   [1] Follow-up call to recent contact AND [2] information only call, no triage required    Answer Assessment - Initial Assessment Questions  1. REASON FOR CALL or QUESTION: "What is your reason for calling today?" or "How can I best help you?" or "What question do you have that I can help answer?"      "I want to know the results of my stress test."    Protocols used:  INFORMATION ONLY CALL-A-    Patient called for results from recent PET Stress Test. Informed patient that Dr. Turcios tried to reach him several times concerning the results, and she mailed them. Read notes from today to inform the patient that he does not have any blockages detected by the test. Patient advised to call Dr. Turcios directly if he needs more explanation about the testing. Patient verbalized understanding of information provided.   "

## 2018-02-09 NOTE — TELEPHONE ENCOUNTER
Several attempts were made to reach patient by phone but no answer ( left several messages on his cell and at home ). Results of recent Pet Stress were mailed to him. He is not on [facility].    .Notes Recorded by Claudette Turcios MD on 2/5/2018 at 11:59 AM CST  The PET stress test did not show evidence of any significant blockages in the coronary arteries.

## 2018-04-02 ENCOUNTER — TELEPHONE (OUTPATIENT)
Dept: TRANSPLANT | Facility: CLINIC | Age: 48
End: 2018-04-02

## 2018-04-02 NOTE — TELEPHONE ENCOUNTER
----- Message from lCaudette Turcios MD sent at 3/29/2018  1:29 PM CDT -----  His LVEF remains mildly depressed, however there was no evidence of obstructive CAD. I would recommend up-titrating his carvedilol and ramipril as tolerated and repeating an echo in 3 months.    ----- Message -----  From: Emeka Malave RN  Sent: 3/29/2018  12:03 PM  To: Claudette Turcios MD    Good Afternoon Dr. Turcios,    This pt is currently inactive on the kidney transplant waitlist due to low EF. You saw the pt on 1/3/18 & ordered a PET stress, completed 2/5/18. Based on those results, do you think the pt can proceed with transplant from a cardiology perspective?    Thank You  Blanche Malave RN  Kidney Transplant Coordinator

## 2018-05-03 ENCOUNTER — LAB VISIT (OUTPATIENT)
Dept: LAB | Facility: HOSPITAL | Age: 48
End: 2018-05-03
Payer: MEDICARE

## 2018-05-03 DIAGNOSIS — Z76.82 ORGAN TRANSPLANT CANDIDATE: ICD-10-CM

## 2018-05-03 PROCEDURE — 86833 HLA CLASS II HIGH DEFIN QUAL: CPT | Mod: PO,TXP

## 2018-05-03 PROCEDURE — 86832 HLA CLASS I HIGH DEFIN QUAL: CPT | Mod: PO,TXP

## 2018-05-19 LAB — HPRA INTERPRETATION: NORMAL

## 2018-06-01 LAB
CLASS I ANTIBODY COMMENTS - LUMINEX: NORMAL
CLASS II ANTIBODIES - LUMINEX: NEGATIVE
CPRA %: 0
SERUM COLLECTION DT - LUMINEX CLASS I: NORMAL
SERUM COLLECTION DT - LUMINEX CLASS II: NORMAL
SPCL1 TESTING DATE: NORMAL
SPCL2 TESTING DATE: NORMAL
SPCLU TESTING DATE: NORMAL

## 2018-07-12 ENCOUNTER — OFFICE VISIT (OUTPATIENT)
Dept: CARDIOLOGY | Facility: CLINIC | Age: 48
End: 2018-07-12
Payer: MEDICARE

## 2018-07-12 VITALS
HEART RATE: 54 BPM | HEIGHT: 70 IN | SYSTOLIC BLOOD PRESSURE: 131 MMHG | BODY MASS INDEX: 20.04 KG/M2 | WEIGHT: 140 LBS | DIASTOLIC BLOOD PRESSURE: 69 MMHG

## 2018-07-12 DIAGNOSIS — E78.00 PURE HYPERCHOLESTEROLEMIA: ICD-10-CM

## 2018-07-12 DIAGNOSIS — N18.6 ESRD ON PERITONEAL DIALYSIS: Chronic | ICD-10-CM

## 2018-07-12 DIAGNOSIS — F17.200 SMOKER: ICD-10-CM

## 2018-07-12 DIAGNOSIS — I10 ESSENTIAL HYPERTENSION: Chronic | ICD-10-CM

## 2018-07-12 DIAGNOSIS — I42.0 DILATED CARDIOMYOPATHY: Primary | ICD-10-CM

## 2018-07-12 DIAGNOSIS — Z99.2 ESRD ON PERITONEAL DIALYSIS: Chronic | ICD-10-CM

## 2018-07-12 PROCEDURE — 99999 PR PBB SHADOW E&M-EST. PATIENT-LVL III: CPT | Mod: PBBFAC,TXP,, | Performed by: INTERNAL MEDICINE

## 2018-07-12 PROCEDURE — 99213 OFFICE O/P EST LOW 20 MIN: CPT | Mod: PBBFAC,NTX | Performed by: INTERNAL MEDICINE

## 2018-07-12 PROCEDURE — 99214 OFFICE O/P EST MOD 30 MIN: CPT | Mod: S$PBB,NTX,, | Performed by: INTERNAL MEDICINE

## 2018-07-12 RX ORDER — RAMIPRIL 5 MG/1
5 CAPSULE ORAL DAILY
Qty: 90 CAPSULE | Refills: 3 | Status: SHIPPED | OUTPATIENT
Start: 2018-07-12 | End: 2019-08-12 | Stop reason: SDUPTHER

## 2018-07-12 NOTE — PATIENT INSTRUCTIONS
Increase ramipril to 5 mg daily.    If blood pressure drops too low, you can decrease amlodipine to 5 mg daily.    Echo prior to next visit to check on heart function.    Fasting labs to check cholesterol level.

## 2018-07-12 NOTE — PROGRESS NOTES
Subjective:   Patient ID:  Brett Frye Jr. is a 47 y.o. male who presents for follow-up of Hypertension (6 month f/u )      HPI: He has been feeling well. Brett Frye Jr. denies any chest pain, shortness of breath, PND, orthopnea, palpitations, leg edema, or syncope. He is on HD T, Th, Sat. His PET stress showed no ischemia or scar. LVEF was moderately reduced. He is still smoking 1/2 pack cigarettes per day. His last echo 11/17 showed a drop in his LVEF from 55-60% to 40-45% associated with global hypokinesis. His LVEDD was enlarged at 5.9 cm. RV function was normal    ECG (2/5/18): NSR 65 bpm.    Past Medical History:   Diagnosis Date    Anemia of renal disease     ESRD on dialysis since 6/14/12     Essential hypertension     Horseshoe kidney     Hyperlipidemia     Pure hypercholesterolemia 7/12/2018    Secondary hyperparathyroidism of renal origin        Past Surgical History:   Procedure Laterality Date    PERITONEAL CATHETER INSERTION  2012    x 2       Social History     Social History    Marital status:      Spouse name: N/A    Number of children: N/A    Years of education: N/A     Social History Main Topics    Smoking status: Current Every Day Smoker     Packs/day: 1.00     Years: 20.00     Types: Cigarettes    Smokeless tobacco: Never Used      Comment: started smoking at age 20; at the most smoked 1 ppd; currently smoking 10 or less cig/day    Alcohol use No      Comment: previously would drink a 12 pack of beer on the weekends; quit 2 years ago    Drug use: Unknown      Comment: remote marijuana use in his 20s    Sexual activity: Not Asked     Other Topics Concern    None     Social History Narrative    Lives with girlfriend who will be his caregiver    Doesn't work currently - used to be a  and        Family History   Problem Relation Age of Onset    Diabetes Mother     Hypertension Mother     Diabetes Father     Hypertension Father      Coronary artery disease Father         MI in his early 60s    Heart attack Father     Kidney disease Neg Hx     Cancer Neg Hx        Patient's Medications   New Prescriptions    RAMIPRIL (ALTACE) 5 MG CAPSULE    Take 1 capsule (5 mg total) by mouth once daily.   Previous Medications    ALLOPURINOL (ZYLOPRIM) 100 MG TABLET    Take 100 mg by mouth once daily.    AMLODIPINE (NORVASC) 10 MG TABLET    Take 10 mg by mouth once daily.    ATORVASTATIN (LIPITOR) 10 MG TABLET    Take 10 mg by mouth every evening.     B COMPLEX-VITAMIN C-FOLIC ACID 0.8 MG TAB    Take 1 tablet by mouth once daily.    CALCITRIOL (ROCALTROL) 0.25 MCG CAP    Take 0.25 mcg by mouth once daily.    CARVEDILOL (COREG) 6.25 MG TABLET    Take 6.25 mg by mouth 2 (two) times daily.    DOXAZOSIN (CARDURA) 1 MG TABLET    Take 1 mg by mouth once daily.     FERRIC CITRATE (AURYXIA ORAL)    Take 2 tablets by mouth 3 (three) times daily.    FERRIC CITRATE 210 MG IRON TAB    Take 210 mg by mouth 3 (three) times daily.    FUROSEMIDE (LASIX) 80 MG TABLET    Take 80 mg by mouth once daily.    HYDRALAZINE (APRESOLINE) 25 MG TABLET    Take 25 mg by mouth 3 (three) times daily.    HYDROCODONE-ACETAMINOPHEN 5-325MG (NORCO) 5-325 MG PER TABLET    Take 1 tablet by mouth every 6 (six) hours as needed for Pain.    ISOSORBIDE DINITRATE (ISORDIL) 20 MG TABLET    Take 20 mg by mouth 3 (three) times daily.    K PHOS DI & MONO-SOD PHOS MONO (K-PHOS-NEUTRAL) 250 MG TAB    Take 500 mg by mouth 3 (three) times daily after meals.    LANTHANUM (FOSRENOL) 1000 MG CHEWABLE TABLET    Take 1,000 mg by mouth 3 (three) times daily.    MULTIVITAMIN WITH MINERALS TABLET    Take 1 tablet by mouth once daily.    SEVELAMER CARBONATE (RENVELA) 800 MG TAB    Take 800 mg by mouth 3 (three) times daily with meals.    SODIUM BICARBONATE 650 MG TABLET    Take 650 mg by mouth 2 (two) times daily.   Modified Medications    No medications on file   Discontinued Medications    RAMIPRIL (ALTACE) 2.5  "MG CAPSULE    Take 2.5 mg by mouth once daily.       Review of Systems   Constitution: Negative for weakness, malaise/fatigue and weight gain.   HENT: Negative for hearing loss.    Eyes: Negative for visual disturbance.   Cardiovascular: Negative for chest pain, claudication, dyspnea on exertion, leg swelling, near-syncope, orthopnea, palpitations, paroxysmal nocturnal dyspnea and syncope.   Respiratory: Negative for cough, shortness of breath, sleep disturbances due to breathing, snoring and wheezing.    Endocrine: Negative for cold intolerance, heat intolerance, polydipsia, polyphagia and polyuria.   Hematologic/Lymphatic: Negative for bleeding problem. Does not bruise/bleed easily.   Skin: Negative for rash and suspicious lesions.   Musculoskeletal: Negative for arthritis, falls, joint pain, muscle weakness and myalgias.   Gastrointestinal: Negative for abdominal pain, change in bowel habit, constipation, diarrhea, heartburn, hematochezia, melena and nausea.   Genitourinary: Negative for hematuria and nocturia.   Neurological: Negative for excessive daytime sleepiness, dizziness, headaches, light-headedness and loss of balance.   Psychiatric/Behavioral: Negative for depression. The patient is not nervous/anxious.    Allergic/Immunologic: Negative for environmental allergies.       /69 (BP Location: Right arm, Patient Position: Sitting, BP Method: Medium (Automatic))   Pulse (!) 54   Ht 5' 10" (1.778 m)   Wt 63.5 kg (139 lb 15.9 oz)   BMI 20.09 kg/m²     Objective:   Physical Exam   Constitutional: He is oriented to person, place, and time. He appears well-developed and well-nourished.        HENT:   Head: Normocephalic and atraumatic.   Mouth/Throat: Oropharynx is clear and moist.   Eyes: Conjunctivae and EOM are normal. Pupils are equal, round, and reactive to light. No scleral icterus.   Neck: Normal range of motion. Neck supple. No hepatojugular reflux and no JVD present. No tracheal deviation " present. No thyromegaly present.   Cardiovascular: Normal rate, regular rhythm, normal heart sounds and intact distal pulses.  PMI is not displaced.    Pulses:       Carotid pulses are 2+ on the right side, and 2+ on the left side.       Radial pulses are 2+ on the right side, and 2+ on the left side.        Dorsalis pedis pulses are 2+ on the right side, and 2+ on the left side.        Posterior tibial pulses are 2+ on the right side, and 2+ on the left side.   AV fistula in left arm with palpable thrill   Pulmonary/Chest: Effort normal and breath sounds normal.   Abdominal: Soft. Bowel sounds are normal. He exhibits no distension and no mass. There is no hepatosplenomegaly. There is no tenderness.   Musculoskeletal: He exhibits no edema or tenderness.   Lymphadenopathy:     He has no cervical adenopathy.   Neurological: He is alert and oriented to person, place, and time.   Skin: Skin is warm and dry. No rash noted. No cyanosis or erythema. Nails show no clubbing.   Psychiatric: He has a normal mood and affect. His speech is normal and behavior is normal.       Lab Results   Component Value Date     09/21/2017    K 4.9 09/21/2017     09/21/2017    CO2 27 09/21/2017    BUN 22 (H) 09/21/2017    CREATININE 7.77 (H) 09/21/2017    GLU 91 09/21/2017    AST 11 02/16/2016    ALT 13 02/16/2016    ALBUMIN 3.7 02/16/2016    PROT 6.9 02/16/2016    BILITOT 0.4 02/16/2016    WBC 9.58 02/16/2016    HGB 12.1 (L) 09/21/2017    HCT 39.1 (L) 02/16/2016    MCV 99 (H) 02/16/2016     02/16/2016    INR 1.1 02/16/2016    TSH 3.48 06/09/2009    CHOL 123 02/16/2016    HDL 41 02/16/2016    LDLCALC 64.4 02/16/2016    TRIG 88 02/16/2016       Assessment:     1. Dilated cardiomyopathy : FC I, Stage B. Recent PET negative for ischemia. Increase ramipril to 5 mg daily. Continue carvedilol. Follow up echo prior to next visit.   2. Essential hypertension : Blood pressure is at goal. If it drops with increased ramipril, decrease  amlodipine to 5 mg daily.   3. ESRD on dialysis since 6/14/12    4. Hyperlipidemia: FLP ordered. Continue atorvastatin.   5. Smoker : Smoking cessation encouraged. Declined smoking cessation program.       Plan:     Brett was seen today for hypertension.    Diagnoses and all orders for this visit:    Dilated cardiomyopathy  -     ramipril (ALTACE) 5 MG capsule; Take 1 capsule (5 mg total) by mouth once daily.  -     Lipid panel; Future  -     2D echo with color flow doppler; Future    Essential hypertension  -     ramipril (ALTACE) 5 MG capsule; Take 1 capsule (5 mg total) by mouth once daily.  -     Lipid panel; Future  -     2D echo with color flow doppler; Future    ESRD on dialysis since 6/14/12  -     ramipril (ALTACE) 5 MG capsule; Take 1 capsule (5 mg total) by mouth once daily.  -     Lipid panel; Future  -     2D echo with color flow doppler; Future    Pure hypercholesterolemia  -     ramipril (ALTACE) 5 MG capsule; Take 1 capsule (5 mg total) by mouth once daily.  -     Lipid panel; Future  -     2D echo with color flow doppler; Future    Smoker        Thank you for allowing me to participate in this patient's care. Please do not hesitate to contact me with any questions or concerns.

## 2018-07-17 ENCOUNTER — TELEPHONE (OUTPATIENT)
Dept: TRANSPLANT | Facility: CLINIC | Age: 48
End: 2018-07-17

## 2018-07-17 NOTE — TELEPHONE ENCOUNTER
----- Message from Medina Lani sent at 7/12/2018  5:23 PM CDT -----      ----- Message -----  From: Halle Sanchez MD  Sent: 7/12/2018   3:48 PM  To: Claudette Turcios MD, #    Thanks. Not sure if it is helpful!  Will discuss with txp team if everyone if OK with transplanting him with current EF.    ----- Message -----  From: Claudette Turcios MD  Sent: 7/12/2018   2:09 PM  To: MD Wali Goldman.    I saw Brett today. He is asymptomatic from his cardiomyopathy. I increased his ramipril today and planned on checking a repeat echo in 6 months. We could do it sooner if you think that would be helpful. I dont know if his LVEF will improve post transplant. It may as I dont have another clear reason for his cardiomyopathy. He has a history of HTN, but he does not have significant LVH, and his blood pressure has been reasonably well controlled.    Regards,  Claudette Turcios    ----- Message -----  From: Halle Sanchez MD  Sent: 6/20/2018  11:03 AM  To: Claudette Turcios MD, [address]   I see previously evaluated evaluated Brett Frye. It appears he has a nonischemic cardiomyopathy with EF in 2017 being 40%.   I am reviewing his pre transplant candidacy with my nurses.  His EF is at the low-marginal range for our transplant center.  Do you believe his EF can improve with transplant?    We have sometimes asked [facility]TS to see our [facility] patients (usually lower EF) to comment on whether they believe this is uremic cardiomyopathy that could be reversed post transplantation.  Do you see improved EF this as a realistic possibility for Brett? Youe are seeing him again 7/12/18, and it is fine if you wish to assess him then and get back to us.    Regards,  Dr. Yohana Sanchez

## 2018-08-09 DIAGNOSIS — Z76.82 AWAITING ORGAN TRANSPLANT STATUS: Primary | ICD-10-CM

## 2018-10-10 DIAGNOSIS — Z76.82 ORGAN TRANSPLANT CANDIDATE: ICD-10-CM

## 2019-04-17 DIAGNOSIS — Z76.82 ORGAN TRANSPLANT CANDIDATE: Primary | ICD-10-CM

## 2019-05-15 ENCOUNTER — TELEPHONE (OUTPATIENT)
Dept: TRANSPLANT | Facility: CLINIC | Age: 49
End: 2019-05-15

## 2019-05-15 NOTE — TELEPHONE ENCOUNTER
Compliance check:  SW received compliance check via fax. Pt had a listed clinic appointment on 5/6/19 and no-showed.     In the last 3 months, pt has had 0AMAs, 1 no show(2/8/19) due to facility getting new floors-not pt's fault. And Does not sign off early. No concerns with labs, caregivers or transportation listed. Last PTH was 608 on 4/18/19.     SW remains available.

## 2019-05-21 ENCOUNTER — TELEPHONE (OUTPATIENT)
Dept: TRANSPLANT | Facility: CLINIC | Age: 49
End: 2019-05-21

## 2019-05-24 ENCOUNTER — TELEPHONE (OUTPATIENT)
Dept: TRANSPLANT | Facility: CLINIC | Age: 49
End: 2019-05-24

## 2019-07-03 ENCOUNTER — TELEPHONE (OUTPATIENT)
Dept: CARDIOLOGY | Facility: CLINIC | Age: 49
End: 2019-07-03

## 2019-07-03 DIAGNOSIS — R00.2 PALPITATIONS: Primary | ICD-10-CM

## 2019-07-05 ENCOUNTER — HOSPITAL ENCOUNTER (OUTPATIENT)
Dept: CARDIOLOGY | Facility: CLINIC | Age: 49
Discharge: HOME | End: 2019-07-05
Attending: NURSE PRACTITIONER
Payer: MEDICARE

## 2019-07-05 ENCOUNTER — OFFICE VISIT (OUTPATIENT)
Dept: CARDIOLOGY | Facility: CLINIC | Age: 49
End: 2019-07-05
Payer: MEDICARE

## 2019-07-05 ENCOUNTER — HOSPITAL ENCOUNTER (OUTPATIENT)
Dept: RADIOLOGY | Facility: HOSPITAL | Age: 49
Discharge: HOME | End: 2019-07-05
Attending: NURSE PRACTITIONER
Payer: MEDICARE

## 2019-07-05 ENCOUNTER — OFFICE VISIT (OUTPATIENT)
Dept: TRANSPLANT | Facility: CLINIC | Age: 49
End: 2019-07-05
Payer: MEDICARE

## 2019-07-05 ENCOUNTER — HOSPITAL ENCOUNTER (OUTPATIENT)
Dept: RADIOLOGY | Facility: HOSPITAL | Age: 49
Discharge: HOME | End: 2019-07-05
Attending: TRANSPLANT SURGERY
Payer: MEDICARE

## 2019-07-05 VITALS
HEART RATE: 62 BPM | WEIGHT: 139 LBS | HEART RATE: 71 BPM | HEIGHT: 70 IN | SYSTOLIC BLOOD PRESSURE: 119 MMHG | HEIGHT: 71 IN | DIASTOLIC BLOOD PRESSURE: 62 MMHG | DIASTOLIC BLOOD PRESSURE: 60 MMHG | BODY MASS INDEX: 19.9 KG/M2 | SYSTOLIC BLOOD PRESSURE: 118 MMHG | BODY MASS INDEX: 19.54 KG/M2 | WEIGHT: 139.56 LBS

## 2019-07-05 VITALS
SYSTOLIC BLOOD PRESSURE: 144 MMHG | RESPIRATION RATE: 16 BRPM | DIASTOLIC BLOOD PRESSURE: 63 MMHG | HEART RATE: 53 BPM | BODY MASS INDEX: 20.51 KG/M2 | OXYGEN SATURATION: 98 % | HEIGHT: 69 IN | TEMPERATURE: 96 F | WEIGHT: 138.44 LBS

## 2019-07-05 DIAGNOSIS — Z99.2 ESRD ON PERITONEAL DIALYSIS: Chronic | ICD-10-CM

## 2019-07-05 DIAGNOSIS — Q63.1 HORSESHOE KIDNEY: Chronic | ICD-10-CM

## 2019-07-05 DIAGNOSIS — D63.1 ANEMIA OF RENAL DISEASE: Chronic | ICD-10-CM

## 2019-07-05 DIAGNOSIS — N18.6 ESRD ON PERITONEAL DIALYSIS: Chronic | ICD-10-CM

## 2019-07-05 DIAGNOSIS — Z76.82 ORGAN TRANSPLANT CANDIDATE: Primary | ICD-10-CM

## 2019-07-05 DIAGNOSIS — Z76.82 ORGAN TRANSPLANT CANDIDATE: ICD-10-CM

## 2019-07-05 DIAGNOSIS — Z01.818 PRE-TRANSPLANT EVALUATION FOR CHRONIC KIDNEY DISEASE: Primary | ICD-10-CM

## 2019-07-05 DIAGNOSIS — F17.200 SMOKER: ICD-10-CM

## 2019-07-05 DIAGNOSIS — I42.0 DILATED CARDIOMYOPATHY: ICD-10-CM

## 2019-07-05 DIAGNOSIS — E78.00 PURE HYPERCHOLESTEROLEMIA: ICD-10-CM

## 2019-07-05 DIAGNOSIS — N18.9 ANEMIA OF RENAL DISEASE: Chronic | ICD-10-CM

## 2019-07-05 DIAGNOSIS — Z01.818 PRE-TRANSPLANT EVALUATION FOR CHRONIC KIDNEY DISEASE: ICD-10-CM

## 2019-07-05 DIAGNOSIS — I10 ESSENTIAL HYPERTENSION: Chronic | ICD-10-CM

## 2019-07-05 DIAGNOSIS — N25.81 SECONDARY HYPERPARATHYROIDISM OF RENAL ORIGIN: Chronic | ICD-10-CM

## 2019-07-05 LAB
ASCENDING AORTA: 3.62 CM
AV INDEX (PROSTH): 0.85
AV MEAN GRADIENT: 5 MMHG
AV PEAK GRADIENT: 10 MMHG
AV VALVE AREA: 2.89 CM2
AV VELOCITY RATIO: 0.87
BSA FOR ECHO PROCEDURE: 1.76 M2
CV ECHO LV RWT: 0.46 CM
DOP CALC AO PEAK VEL: 1.56 M/S
DOP CALC AO VTI: 26.2 CM
DOP CALC LVOT AREA: 3.4 CM2
DOP CALC LVOT DIAMETER: 2.08 CM
DOP CALC LVOT PEAK VEL: 1.35 M/S
DOP CALC LVOT STROKE VOLUME: 75.8 CM3
DOP CALCLVOT PEAK VEL VTI: 22.32 CM
E WAVE DECELERATION TIME: 229.71 MSEC
E/A RATIO: 1.06
E/E' RATIO: 6.93 M/S
ECHO LV POSTERIOR WALL: 1.18 CM (ref 0.6–1.1)
FRACTIONAL SHORTENING: 25 % (ref 28–44)
INTERVENTRICULAR SEPTUM: 0.78 CM (ref 0.6–1.1)
IVRT: 0.14 MSEC
LA MAJOR: 4.16 CM
LA MINOR: 4.44 CM
LA WIDTH: 3.98 CM
LEFT ATRIUM SIZE: 3.32 CM
LEFT ATRIUM VOLUME INDEX: 27 ML/M2
LEFT ATRIUM VOLUME: 48.24 CM3
LEFT INTERNAL DIMENSION IN SYSTOLE: 3.81 CM (ref 2.1–4)
LEFT VENTRICLE DIASTOLIC VOLUME INDEX: 69.2 ML/M2
LEFT VENTRICLE DIASTOLIC VOLUME: 123.74 ML
LEFT VENTRICLE MASS INDEX: 102 G/M2
LEFT VENTRICLE SYSTOLIC VOLUME INDEX: 34.9 ML/M2
LEFT VENTRICLE SYSTOLIC VOLUME: 62.35 ML
LEFT VENTRICULAR INTERNAL DIMENSION IN DIASTOLE: 5.1 CM (ref 3.5–6)
LEFT VENTRICULAR MASS: 183.01 G
LV LATERAL E/E' RATIO: 5.78 M/S
LV SEPTAL E/E' RATIO: 8.67 M/S
MV PEAK A VEL: 0.49 M/S
MV PEAK E VEL: 0.52 M/S
PISA TR MAX VEL: 2.24 M/S
PULM VEIN S/D RATIO: 1.02
PV PEAK D VEL: 0.45 M/S
PV PEAK S VEL: 0.46 M/S
RA MAJOR: 3.85 CM
RA PRESSURE: 8 MMHG
RA WIDTH: 3.33 CM
RIGHT VENTRICULAR END-DIASTOLIC DIMENSION: 2.92 CM
RV TISSUE DOPPLER FREE WALL SYSTOLIC VELOCITY 1 (APICAL 4 CHAMBER VIEW): 13.28 CM/S
SINUS: 3.68 CM
STJ: 3.21 CM
TDI LATERAL: 0.09 M/S
TDI SEPTAL: 0.06 M/S
TDI: 0.08 M/S
TR MAX PG: 20 MMHG
TRICUSPID ANNULAR PLANE SYSTOLIC EXCURSION: 2.84 CM
TV REST PULMONARY ARTERY PRESSURE: 28 MMHG

## 2019-07-05 PROCEDURE — 99204 OFFICE O/P NEW MOD 45 MIN: CPT | Mod: S$PBB,TXP,, | Performed by: INTERNAL MEDICINE

## 2019-07-05 PROCEDURE — 76770 US EXAM ABDO BACK WALL COMP: CPT | Mod: TC,TXP

## 2019-07-05 PROCEDURE — 99999 PR PBB SHADOW E&M-EST. PATIENT-LVL III: ICD-10-PCS | Mod: PBBFAC,TXP,, | Performed by: INTERNAL MEDICINE

## 2019-07-05 PROCEDURE — 99215 PR OFFICE/OUTPT VISIT, EST, LEVL V, 40-54 MIN: ICD-10-PCS | Mod: S$PBB,TXP,, | Performed by: NURSE PRACTITIONER

## 2019-07-05 PROCEDURE — 71046 XR CHEST PA AND LATERAL: ICD-10-PCS | Mod: 26,TXP,, | Performed by: RADIOLOGY

## 2019-07-05 PROCEDURE — 93306 TTE W/DOPPLER COMPLETE: CPT | Mod: PBBFAC,TXP | Performed by: INTERNAL MEDICINE

## 2019-07-05 PROCEDURE — 99999 PR PBB SHADOW E&M-EST. PATIENT-LVL III: CPT | Mod: PBBFAC,TXP,, | Performed by: INTERNAL MEDICINE

## 2019-07-05 PROCEDURE — 71046 X-RAY EXAM CHEST 2 VIEWS: CPT | Mod: 26,TXP,, | Performed by: RADIOLOGY

## 2019-07-05 PROCEDURE — 99215 OFFICE O/P EST HI 40 MIN: CPT | Mod: S$PBB,TXP,, | Performed by: NURSE PRACTITIONER

## 2019-07-05 PROCEDURE — 76770 US EXAM ABDO BACK WALL COMP: CPT | Mod: 26,TXP,, | Performed by: RADIOLOGY

## 2019-07-05 PROCEDURE — 76770 US RETROPERITONEAL COMPLETE: ICD-10-PCS | Mod: 26,TXP,, | Performed by: RADIOLOGY

## 2019-07-05 PROCEDURE — 99999 PR PBB SHADOW E&M-EST. PATIENT-LVL IV: ICD-10-PCS | Mod: PBBFAC,TXP,, | Performed by: NURSE PRACTITIONER

## 2019-07-05 PROCEDURE — 99214 OFFICE O/P EST MOD 30 MIN: CPT | Mod: PBBFAC,25,27,TXP | Performed by: NURSE PRACTITIONER

## 2019-07-05 PROCEDURE — 93306 TRANSTHORACIC ECHO (TTE) COMPLETE (CUPID ONLY): ICD-10-PCS | Mod: 26,S$PBB,TXP, | Performed by: INTERNAL MEDICINE

## 2019-07-05 PROCEDURE — 99204 PR OFFICE/OUTPT VISIT, NEW, LEVL IV, 45-59 MIN: ICD-10-PCS | Mod: S$PBB,TXP,, | Performed by: INTERNAL MEDICINE

## 2019-07-05 PROCEDURE — 72170 XR PELVIS ROUTINE AP: ICD-10-PCS | Mod: 26,TXP,, | Performed by: RADIOLOGY

## 2019-07-05 PROCEDURE — 99999 PR PBB SHADOW E&M-EST. PATIENT-LVL IV: CPT | Mod: PBBFAC,TXP,, | Performed by: NURSE PRACTITIONER

## 2019-07-05 PROCEDURE — 99213 OFFICE O/P EST LOW 20 MIN: CPT | Mod: PBBFAC,TXP | Performed by: INTERNAL MEDICINE

## 2019-07-05 PROCEDURE — 72170 X-RAY EXAM OF PELVIS: CPT | Mod: TC,TXP

## 2019-07-05 PROCEDURE — 72170 X-RAY EXAM OF PELVIS: CPT | Mod: 26,TXP,, | Performed by: RADIOLOGY

## 2019-07-05 PROCEDURE — 71046 X-RAY EXAM CHEST 2 VIEWS: CPT | Mod: TC,TXP

## 2019-07-05 RX ORDER — SUCROFERRIC OXYHYDROXIDE 500 MG/1
1 TABLET, CHEWABLE ORAL
COMMUNITY
Start: 2019-07-02 | End: 2024-01-17

## 2019-07-05 NOTE — PROGRESS NOTES
Transplant Recipient Adult Psychosocial Assessment (Update)    Brett Frye  [address] [facility] [address]  Telephone Information:   Mobile [phone]   Home  [phone] (home)  Work  There is no work phone number on file.  E-mail  No e-mail address on record    Sex: male  YOB: 1970  Age: 48 y.o.    Encounter Date: 7/5/2019  [place] Citizen: yes  Primary Language: English   Needed: no    Emergency Contact:  Name: Halle Frye  Relationship: mother  Address: Same as pt  Phone Numbers:  [phone] (home), [phone] (mobile)    Name: Arjun Rudy  Relationship: significant other   Address: Same as pt.  Phone Numbers:  [phone] (mobile)    Family/Social Support:   Number of dependents/: Pt reports no children of his own. Pt reports pt's significant other has 2 minor children. Pt reports mother and significant other will rotate with caring for pt and children.  Marital history: Pt reports being currently  from wife: Lilia. Pt reports they were  in 1995 and have been  since 2013/2014. Pt reports current significant other as: Pauletteia Rudy and reports they have been together since 2013/2014.  Other family dynamics: Pt reports parents: Brett and Halle Frye and 1 sister: Ryley. Pt identifies all family members as supportive.    Household Composition:  Name: Brett Frye Jr.  Age: 47  Relationship: patient  Does person drive? yes    Name: Arjun Grant  Age: 36  Relationship: significant other  Does person drive? yes    Name: Radha Morris and Sunshine Grant  Age: 14 and 8 (respectively)  Relationship: significant other's children  Does person drive? yes    Do you and your caregivers have access to reliable transportation? yes  PRIMARY CAREGIVER: Pauletteia (pronunced: Jonn-themustapha-lidia Grant (significant other) will be primary caregiver, phone number [phone].      provided in-depth information to  patient and caregiver regarding pre- and post-transplant caregiver role.   strongly encourages patient and caregiver to have concrete plan regarding post-transplant care giving, including back-up caregiver(s) to ensure care giving needs are met as needed.    Patient and Caregiver states understanding all aspects of caregiver role/commitment and is able/willing/committed to being caregiver to the fullest extent necessary.    Patient and Caregiver verbalizes understanding of the education provided today and caregiver responsibilities.         remains available. Patient agree to contact  in a timely manner if concerns arise.      Able to take time off work without financial concerns: yes.     Natty reports that her children will be cared for by her mother: Karen Grant (62), [phone], lives in [place], [place], drives    Additional Significant Others who will Assist with Transplant:  Name: Halle Barker  Age: 66  Phone: [phone]  City: [place] State: [place]  Relationship: mother  Does person drive? yes    Living Will: no Education and information provided to pt. SW also strongly encouraged pt to complete forms due to pt's current marital status.   Healthcare Power of : no Education and information provided to pt. SW also strongly encouraged pt to complete forms due to pt's current marital status.     Patient and Caregiver verbalized understanding and agreement.     Advance Directives on file: <<no information> per medical record.  Verbally reviewed LW/HCPA information.   provided patient with copy of LW/HCPA documents and provided education on completion of forms.    Living Donors: Yes.  Name: sister: yRley Charles or significant other: Arjun Grant. Education and resource information given to patient. SW explained to Patient and Caregiver that both recipients and donors require separate caregivers. Patient and Caregiver verbalized  understanding and agreement.     Highest Education Level: High School (9-12) or GED  Reading Ability: 12th grade  Reports difficulty with: seeing and wears reading glasses  Learns Best By:  Pt reports pt learns best by a combination of verbal, written, and tactile instructions.     Status: no  VA Benefits: no     Working for Income: No  If no, reason not working: Disability  Patient is disabled.  Prior to disability, patient  was employed as a  for [employer] in ..    Spouse/Significant Other Employment: Pt reports Dewayne does not currently work.    Disabled: yes: date disability began: 2015, due to: ESRD.    Monthly Income:   Disability: $1200     Able to afford all costs now and if transplanted, including medications: yes Pt reports that he has raised $4000 from doing suppers. Pt also plans to return to work.   Patient and Caregiver verbalizes understanding of personal responsibilities related to transplant costs and the importance of having a financial plan to ensure that patients transplant costs are fully covered.       provided fundraising information/education. Patient and Caregiververbalizes understanding.   remains available.    Insurance:   Payor/Plan Subscr  Sex Relation Sub. Ins. ID Effective Group Num   1. MEDICARE - ME* JOVAN LOPEZGUNNAR TORRES. 1970 Male  [id number]                                    [address]     Primary Insurance (for UNOS reporting): Public Insurance - Medicare FFS (Fee For Service)  Secondary Insurance (for UNOS reporting): None Pt does have a Part D with [employer] Patient reports that he has applied for medicaid and is pending acceptance.   Patient and Caregiver verbalizes clear understanding that patient may experience difficulty obtaining and/or be denied insurance coverage post-surgery. This includes and is not limited to disability insurance, life insurance, health insurance, burial  insurance, long term care insurance, and other insurances.      Patient and Caregiver also reports understanding that future health concerns related to or unrelated to transplantation may not be covered by patient's insurance.  Resources and information provided and reviewed.     Patient and Caregiver provides verbal permission to release any necessary information to outside resources for patient care and discharge planning.  Resources and information provided are reviewed.      Dialysis Adherence: Patient reports being on hemodialysis in center attending all dialysis appointments and staying for the entire course of treatment. SW faxed an adherence form (see Letters section) and is awaiting a fax back.       Infusion Service: patient utilizing? no  Home Health: patient utilizing? no  DME: yes BP Cuff  Pulmonary/Cardiac Rehab: Pt denies   ADLS:  Pt reports no difficulties with driving, walking, bathing, cooking, housekeeping, eating, shopping, and taking medication.    Adherence:   Pt reports good adherence with medications, dialysis, and health regimen.  Adherence education and counseling provided.     Per History Section:  Past Medical History:   Diagnosis Date    Anemia of renal disease     ESRD on dialysis since 6/14/12     Essential hypertension     Horseshoe kidney     Hyperlipidemia     Pure hypercholesterolemia 7/12/2018    Secondary hyperparathyroidism of renal origin      Social History     Tobacco Use    Smoking status: Current Every Day Smoker     Packs/day: 1.00     Years: 20.00     Pack years: 20.00     Types: Cigarettes    Smokeless tobacco: Never Used    Tobacco comment: started smoking at age 20; at the most smoked 1 ppd; currently smoking 10 or less cig/day   Substance Use Topics    Alcohol use: No     Alcohol/week: 0.0 oz     Comment: previously would drink a 12 pack of beer on the weekends; quit 2 years ago     Social History     Substance and Sexual Activity   Drug Use Not on file     Comment: remote marijuana use in his 20s     Social History     Substance and Sexual Activity   Sexual Activity Not on file       Per Today's Psychosocial:  Tobacco: Pt reports he currently smokes about 0.5 ppd. Pt reports that he has smoked up to 1ppd. Pt reports he has smoked for 25 years..  Alcohol: Pt reports that he used to drink about a 12 pack on the weekend and reports quitting in 2014..  Illicit Drugs/Non-prescribed Medications: Pt reports a remote history of marijuana use in his 20s. Pt reports no current use..    Patient and Caregiver states clear understanding of the potential impact of substance use as it relates to transplant candidacy and is aware of possible random substance screening.  Substance abstinence/cessation counseling, education and resources provided and reviewed.     Arrests/DWI: Pt reports being arrested once in his 20s for a DWI traffic ticket.  Treatment/Rehab: patient denies    Psychiatric History:    Mental Health: Pt reports no history of or current mental health issues or concerns.   Psychiatrist/Counselor: Pt denies seeing a mental health professional and reports being open to seeing the psych department for talk therapy if necessary.  Medications:  Pt denies taking medications for mental health reasons.  Suicide/Homicide Issues: Pt denies any history of or current suicidal or homicidal ideations.    Safety at home: Pt reports no current or history of safety concerns in household.    Knowledge: Patient and Caregiver states having clear understanding and realistic expectations regarding the potential risks and potential benefits of organ transplantation and organ donation and agrees to discuss with health care team members and support system members, as well as to utilize available resources and express questions and/or concerns in order to further facilitate the pt informed decision-making.  Resources and information provided and reviewed.    Patient and Caregiver is aware of  [facility]'s affiliation and/or partnership with agencies in home health care, LTAC, SNF, [facility], and other hospitals and clinics.    Understanding: Patient and Caregiver reports having a clear understanding of the many lifetime commitments involved with being a transplant recipient, including costs, compliance, medications, lab work, procedures, appointments, concrete and financial planning, preparedness, timely and appropriate communication of concerns, abstinence (ETOH, tobacco, illicit non-prescribed drugs), adherence to all health care team recommendations, support system and caregiver involvement, appropriate and timely resource utilization and follow-through, mental health counseling as needed/recommended, and patient and caregiver responsibilities.  Social Service Handbook, resources and detailed educational information provided and reviewed.  Educational information provided.    Patient and Caregiver also reports current and expected compliance with health care regime and states having a clear understanding of the importance of compliance.      Patient and Caregiver reports a clear understanding that risks and benefits may be involved with organ transplantation and with organ donation.       Patient and Caregiver also reports clear understanding that psychosocial risk factors may affect patient, and include but are not limited to feelings of depression, generalized anxiety, anxiety regarding dependence on others, post traumatic stress disorder, feelings of guilt and other emotional and/or mental concerns, and/or exacerbation of existing mental health concerns.  Detailed resources provided and discussed.      Patient and Caregiver agrees to access appropriate resources in a timely manner as needed and/or as recommended, and to communicate concerns appropriately.  Patient and Caregiver also reports a clear understanding of treatment options available.     Patient and Caregiver received education in a group setting.  "  reviewed education, provided additional information, and answered questions.    Feelings or Concerns: Patient and Caregiver did not express any concerns at this time.    Coping: Pt reports coping well with the transplant process at this time and reports spending time with parents, taking a walk, and playing his [employer] as ways to cope.    Goals: Pt reports going back to work, "hoping this kidney lasts a long time" and "starting my life back over if the good Lord says the same" as goals for after transplant.  Patient referred to Vocational Rehabilitation.    Interview Behavior: Patient and Caregiver presents as alert and oriented x 4, pleasant, good eye contact, well groomed, recall good, concentration/judgement good, average intelligence, calm, communicative, cooperative and asking and answering questions appropriately. Pt presents with significant other: Sanjay Grant at pt's request.         Transplant Social Work - Candidacy  Assessment/Plan:     Psychosocial Suitability: Patient presents as a suitable candidate for kidney transplant at this time. Based on psychosocial risk factors, patient presents as low risk, due to adequate financial plan, suitable dialysis adherence, and caregiver plan in place..    Recommendations/Additional Comments: SW recommends that pt conduct fundraising to assist pt with pay for cost of medications, food, gas, and other transplant related needs. SW recommends that pt remain aware of potential mental health concerns and contact the team if any concerns arise. SW recommends that pt remain abstinent from tobacco, ETOH, and drug use. SW supports pt's continued dialysis adherence. SW remains available to answer any questions or concerns that arise as the pt moves through the transplant process.     Katerin Thomason LMSW       "

## 2019-07-05 NOTE — PROGRESS NOTES
"Subjective:    Patient ID:  Brett Frye Jr. is a 48 y.o. male who presents for follow-up of Kidney Transplant Evaluation (Low EF)      HPI   The patient is a 48 year old male last seen in cardiology by Dr Turcios 7/12/18 with moderate LV dysfunction, PET negative for ischemia, ESRD on dialysis sine 2012 due to horseshoe kidney. Is again being evaluated for kidney transplant. He denies chest pain or CARR he exercises occasionally. He has a "normal" exercise stress echo in [place] 3 months ago [will send for copy to scan]    Conclusion 7/5/19    · Low normal left ventricular systolic function. The estimated ejection fraction is 55%  · Concentric left ventricular remodeling.  · Local segmental wall motion abnormalities.  · Normal LV diastolic function.  · Normal right ventricular systolic function.  · Mild aortic regurgitation.  · Mild mitral sclerosis.  · Intermediate central venous pressure (8 mm Hg).  · The estimated PA systolic pressure is 28 mm Hg          CONCLUSIONS: NORMAL MYOCARDIAL PERFUSION PET STRESS TEST  1. The perfusion scan is free of evidence for myocardial ischemia or injury.   2. There is abnormal wall motion at rest showing moderate global hypokinesis of the left ventricle. There is abnormal wall motion at stress showing mild global hypokinesis of the left ventricle.   3. There is resting LV dysfunction with a reduced ejection fraction of 35 %. There is stress induced LV dysfunction with a reduced ejection fraction of 48 %.  (normal is >= 51%)  4. The left ventricular volume is mildly increased at rest.   5. The extracardiac distribution of radioactivity is normal.   6. There was no previous study available to compare.          This document has been electronically    SIGNED BY: Franki Chaudhry MD On: 02/05/2018 11:40  Lab Results   Component Value Date     09/21/2017    K 4.9 09/21/2017     09/21/2017    CO2 27 09/21/2017    BUN 22 (H) 09/21/2017    CREATININE 7.77 (H) 09/21/2017    GLU 91 " 09/21/2017    AST 11 02/16/2016    ALT 13 02/16/2016    ALBUMIN 3.7 02/16/2016    PROT 6.9 02/16/2016    BILITOT 0.4 02/16/2016    WBC 9.58 02/16/2016    HGB 12.1 (L) 09/21/2017    HCT 39.1 (L) 02/16/2016    MCV 99 (H) 02/16/2016     02/16/2016    INR 1.1 02/16/2016    PSA 0.91 11/02/2017    TSH 3.48 06/09/2009         Lab Results   Component Value Date    CHOL 123 02/16/2016    HDL 41 02/16/2016    TRIG 88 02/16/2016       Lab Results   Component Value Date    LDLCALC 64.4 02/16/2016       Past Medical History:   Diagnosis Date    Anemia of renal disease     ESRD on dialysis since 6/14/12     Essential hypertension     Horseshoe kidney     Hyperlipidemia     Pure hypercholesterolemia 7/12/2018    Secondary hyperparathyroidism of renal origin        Current Outpatient Medications:     allopurinol (ZYLOPRIM) 100 MG tablet, Take 100 mg by mouth once daily., Disp: , Rfl:     amlodipine (NORVASC) 10 MG tablet, Take 10 mg by mouth once daily., Disp: , Rfl:     atorvastatin (LIPITOR) 10 MG tablet, Take 10 mg by mouth every evening. , Disp: , Rfl:     calcitRIOL (ROCALTROL) 0.25 MCG Cap, Take 0.25 mcg by mouth once daily., Disp: , Rfl:     FERRIC CITRATE (AURYXIA ORAL), Take 2 tablets by mouth 3 (three) times daily., Disp: , Rfl:     ferric citrate 210 mg iron Tab, Take 210 mg by mouth 3 (three) times daily., Disp: , Rfl:     furosemide (LASIX) 80 MG tablet, Take 80 mg by mouth once daily., Disp: , Rfl:     isosorbide dinitrate (ISORDIL) 20 MG tablet, Take 20 mg by mouth 3 (three) times daily., Disp: , Rfl:     k phos di & mono-sod phos mono (K-PHOS-NEUTRAL) 250 mg Tab, Take 500 mg by mouth 3 (three) times daily after meals., Disp: , Rfl:     ramipril (ALTACE) 5 MG capsule, Take 1 capsule (5 mg total) by mouth once daily., Disp: 90 capsule, Rfl: 3    B complex-vitamin C-folic acid 0.8 mg Tab, Take 1 tablet by mouth once daily., Disp: , Rfl:     carvedilol (COREG) 6.25 MG tablet, Take 6.25 mg by  mouth 2 (two) times daily., Disp: , Rfl:     doxazosin (CARDURA) 1 MG tablet, Take 1 mg by mouth once daily. , Disp: , Rfl:     hydrALAZINE (APRESOLINE) 25 MG tablet, Take 25 mg by mouth 3 (three) times daily., Disp: , Rfl:     hydrocodone-acetaminophen 5-325mg (NORCO) 5-325 mg per tablet, Take 1 tablet by mouth every 6 (six) hours as needed for Pain., Disp: 15 tablet, Rfl: 0    lanthanum (FOSRENOL) 1000 MG chewable tablet, Take 1,000 mg by mouth 3 (three) times daily., Disp: , Rfl:     multivitamin with minerals tablet, Take 1 tablet by mouth once daily., Disp: , Rfl:     sevelamer carbonate (RENVELA) 800 mg Tab, Take 800 mg by mouth 3 (three) times daily with meals., Disp: , Rfl:     sodium bicarbonate 650 MG tablet, Take 650 mg by mouth 2 (two) times daily., Disp: , Rfl:           Review of Systems   Constitution: Negative for decreased appetite, diaphoresis, fever, malaise/fatigue, weight gain and weight loss.   HENT: Negative for congestion, ear discharge, ear pain and nosebleeds.    Eyes: Negative for blurred vision, double vision and visual disturbance.   Cardiovascular: Negative for chest pain, claudication, cyanosis, dyspnea on exertion, irregular heartbeat, leg swelling, near-syncope, orthopnea, palpitations, paroxysmal nocturnal dyspnea and syncope.   Respiratory: Negative for cough, hemoptysis, shortness of breath, sleep disturbances due to breathing, snoring, sputum production and wheezing.    Endocrine: Negative for polydipsia, polyphagia and polyuria.   Hematologic/Lymphatic: Negative for adenopathy and bleeding problem. Does not bruise/bleed easily.   Skin: Negative for color change, nail changes, poor wound healing and rash.   Musculoskeletal: Negative for muscle cramps and muscle weakness.   Gastrointestinal: Negative for abdominal pain, anorexia, change in bowel habit, hematochezia, nausea and vomiting.   Genitourinary: Negative for dysuria, frequency and hematuria.   Neurological: Negative  "for brief paralysis, difficulty with concentration, excessive daytime sleepiness, dizziness, focal weakness, headaches, light-headedness, seizures, vertigo and weakness.   Psychiatric/Behavioral: Negative for altered mental status and depression.   Allergic/Immunologic: Negative for persistent infections.        Objective:/60 (BP Location: Right arm, Patient Position: Sitting, BP Method: Medium (Automatic))   Pulse 71   Ht 5' 10.5" (1.791 m)   Wt 63.3 kg (139 lb 8.8 oz)   BMI 19.74 kg/m²             Physical Exam   Constitutional: He is oriented to person, place, and time. He appears well-developed and well-nourished.   HENT:   Head: Normocephalic.   Right Ear: External ear normal.   Left Ear: External ear normal.   Nose: Nose normal.   Inspection of lips, teeth and gums normal   Eyes: Pupils are equal, round, and reactive to light. EOM are normal. No scleral icterus.   Neck: Normal range of motion. Neck supple. No JVD present. No tracheal deviation present. No thyromegaly present.   Cardiovascular: Normal rate, regular rhythm and intact distal pulses. Exam reveals no gallop and no friction rub.   Murmur heard.   Low-pitched rumbling midsystolic murmur is present with a grade of 1/6 at the upper right sternal border and upper left sternal border radiating to the neck.  Pulses:       Dorsalis pedis pulses are 3+ on the right side, and 3+ on the left side.   Pulmonary/Chest: Effort normal and breath sounds normal.   Abdominal: Bowel sounds are normal. He exhibits no distension. There is no hepatosplenomegaly. There is no tenderness. There is no guarding.   Musculoskeletal: Normal range of motion. He exhibits no edema or tenderness.   Lymphadenopathy:   Palpation of neck and groin lymph nodes normal   Neurological: He is alert and oriented to person, place, and time. No cranial nerve deficit. He exhibits normal muscle tone. Coordination normal.   Skin: Skin is dry.        Palpation of skin normal "   Psychiatric: His behavior is normal. Judgment and thought content normal.         Assessment:       1. Pre-transplant evaluation for chronic kidney disease    2. ESRD on dialysis since 6/14/12    3. Essential hypertension    4. Pure hypercholesterolemia    5. Smoker    6. Horseshoe kidney         Plan:       Brett was seen today for kidney transplant evaluation.    Diagnoses and all orders for this visit:    Pre-transplant evaluation for chronic kidney disease    ESRD on dialysis since 6/14/12    Essential hypertension    Pure hypercholesterolemia    Smoker    Horseshoe kidney    Base on current information he is a low CV risk for planned surgery

## 2019-07-05 NOTE — LETTER
July 5, 2019      Gina Nixon MD  [address]  [facility] [address]           [facility] Cardiology  [address]  [facility] [address]  Phone: [phone]          Patient: Brett Frye Jr.   MR Number: [id number]   YOB: 1970   Date of Visit: 7/5/2019       Dear Dr. Gina Nixon:    Thank you for referring Brett Frye to me for evaluation. Attached you will find relevant portions of my assessment and plan of care.    If you have questions, please do not hesitate to call me. I look forward to following Brett Frye along with you.    Sincerely,    Genaro Zhou MD    Enclosure  CC:  No Recipients    If you would like to receive this communication electronically, please contact [email or web address] or [phone] to request more information on [employer] access.    For providers and/or their staff who would like to refer a patient to [facility], please contact us through our one-stop-shop provider referral line, [facility] , at [phone].    If you feel you have received this communication in error or would no longer like to receive these types of communications, please e-mail [email or web address]

## 2019-07-05 NOTE — LETTER
July 7, 2019        Susie Patel  [address] [facility] [address]  Phone: [phone]  Fax: [phone]             [facility]  [address]  [facility] [address]  Phone: [phone]   Patient: Brett Frye Jr.   MR Number: [id number]   YOB: 1970   Date of Visit: 7/5/2019       Dear Dr. Susie Patel    Thank you for referring Brett Frye to me for evaluation. Attached you will find relevant portions of my assessment and plan of care.    If you have questions, please do not hesitate to call me. I look forward to following Brett Frye along with you.    Sincerely,    Valery Edward NP    Enclosure    If you would like to receive this communication electronically, please contact [email or web address] or [phone] to request [employer] access.    [employer] is a tool which provides read-only access to select patient information with whom you have a relationship. Its easy to use and provides real time access to review your patients record including encounter summaries, notes, results, and demographic information.    If you feel you have received this communication in error or would no longer like to receive these types of communications, please e-mail [email or web address]

## 2019-07-05 NOTE — PROGRESS NOTES
Kidney Transplant Recipient Reevalulation    Referring Physician: Susie Patel  Current Nephrologist: Susie Patel  Waitlist Status: inactive  Dialysis Start Date: 10/3/2017    Subjective:     CC:  Annual reassessment of kidney transplant candidacy.    HPI:  Mr. Frye is a 48 y.o. year old Black or  male with ESRD secondary to HTN.  He has been on the wait list for a kidney transplant at [facility] since 6/14/2012. Patient is currently on hemodialysis started on 10/3/2017. Patient is dialyzing on TTS schedule.  Patient reports that he is tolerating dialysis well. he is dialyzing for 3 hours and 45 minutes. He has a LUE AV fistula. Patient denies any recent hospitalizations or ED visits. Reports feeling well. He will walk for activity. Denies SOB or chest pain. Denies dizziness or syncope.     7/5/2019 CXR, PXR reviewed.   7/5/2019 cardiology f/u today and is considered a low risk per note.   7/5/2019 2 d Echo   TRANSTHORACIC ECHO (TTE) COMPLETE   Conclusion     · Low normal left ventricular systolic function. The estimated ejection fraction is 55%  · Concentric left ventricular remodeling.  · Local segmental wall motion abnormalities.  · Normal LV diastolic function.  · Normal right ventricular systolic function.  · Mild aortic regurgitation.  · Mild mitral sclerosis.  · Intermediate central venous pressure (8 mm Hg).  · The estimated PA systolic pressure is 28 mm Hg       Past Medical History:   Diagnosis Date    Anemia of renal disease     ESRD on dialysis since 6/14/12     Essential hypertension     Horseshoe kidney     Hyperlipidemia     Pure hypercholesterolemia 7/12/2018    Secondary hyperparathyroidism of renal origin        Review of Systems   Constitutional: Negative for activity change, appetite change, chills, fatigue, fever and unexpected weight change.   HENT: Negative for congestion, facial swelling, postnasal drip, rhinorrhea, sinus pressure, sore throat and trouble  "swallowing.    Eyes: Positive for visual disturbance. Negative for pain and redness.        Wears reading glasses   Respiratory: Negative for cough, chest tightness, shortness of breath and wheezing.    Cardiovascular: Negative.  Negative for chest pain, palpitations and leg swelling.   Gastrointestinal: Negative for abdominal pain, diarrhea, nausea and vomiting.   Genitourinary: Positive for decreased urine volume. Negative for dysuria, flank pain and urgency.   Musculoskeletal: Negative for gait problem, neck pain and neck stiffness.   Skin: Negative for rash.   Allergic/Immunologic: Negative for environmental allergies, food allergies and immunocompromised state.   Neurological: Negative for dizziness, weakness, light-headedness and headaches.   Psychiatric/Behavioral: Negative for agitation and confusion. The patient is not nervous/anxious.        Objective:   body mass index is 20.62 kg/m².  BP (!) 144/63 (BP Location: Right arm, Patient Position: Sitting, BP Method: Medium (Automatic))   Pulse (!) 53   Temp 96.4 °F (35.8 °C) (Oral)   Resp 16   Ht 5' 8.7" (1.745 m)   Wt 62.8 kg (138 lb 7.2 oz)   SpO2 98%   BMI 20.62 kg/m²     Physical Exam   Musculoskeletal:        Arms:      Labs:  Lab Results   Component Value Date    WBC 9.58 02/16/2016    HGB 12.1 (L) 09/21/2017    HCT 39.1 (L) 02/16/2016     09/21/2017    K 4.9 09/21/2017     09/21/2017    CO2 27 09/21/2017    BUN 22 (H) 09/21/2017    CREATININE 7.77 (H) 09/21/2017    EGFRNONAA 8 (A) 09/21/2017    CALCIUM 9.5 09/21/2017    PHOS 8.0 (H) 02/16/2016    ALBUMIN 3.7 02/16/2016    AST 11 02/16/2016    ALT 13 02/16/2016    .0 (H) 06/27/2016       No results found for: PREALBUMIN, BILIRUBINUA, GGT, AMYLASE, LIPASE, PROTEINUA, NITRITE, RBCUA, WBCUA    No results found for: HLAABCTYPE    Lab Results   Component Value Date    CPRA 0 05/07/2019    CPRA 0 05/07/2019    CPRA 0 05/07/2019    BI6WCVK Negative 05/07/2019    CIABCLM WEAK--B82 " 05/01/2018    CIIAB Negative 08/07/2018    ABCMT DP3 05/07/2019       Labs were reviewed with the patient.    Pre-transplant Workup:   Reviewed with the patient.    Assessment:     1. Organ transplant candidate    2. ESRD on dialysis since 6/14/12    3. Pre-transplant evaluation for chronic kidney disease    4. Dilated cardiomyopathy    5. Anemia of renal disease    6. Horseshoe kidney    7. Secondary hyperparathyroidism of renal origin        Plan:   Dilated cardiomyopathy-->Low EF--f/u with cardiology   and repeat 2 D Echo acceptable.   Smoking  Smoking cessation encouraged--pt reports that he plans on starting the smoking cessatiob program     renal US pending       Transplant Candidacy:   Mr. Frye is a suitable kidney transplant candidate.  Meets center eligibility for accepting HCV+ donor offer - yes.  Patient educated on HCV+ donors. Brett is willing  to accept HCV+ donor offer -  yes   Patient is a candidate for KDPI > 85 kidney donor offer - yes.  He has experienced health changes that warrant further investigation.  Patient will be placed in an inactive status at present.      Valery Edward NP       Follow-up:   In addition to the tests noted in the plan, Mr. Frye will continue to have reevaluation as per the standing pre-kidney transplant protocol:  1. Monthly blood for PRA  2. Annual return to clinic, except HIV positive, > 65 years of age, or pancreas transplant candidates who will be scheduled to see transplant every 6 months while in pre-transplant phase  3. Annual re-testing: CXR, EKG, yearly mammograms for women over 40 and PSA for males over 40, cardiology follow-up as recommended by initial cardiology pre-transplant evaluation  4. Renal ultrasound every 2 years  5. Baseline colonoscopy after age 50 and repeated as recommended    UNOS Patient Status  Functional Status: 60% - Requires occasional assistance but is able to care for needs  Physical Capacity: No Limitations

## 2019-07-05 NOTE — PROGRESS NOTES
LISTED PATIENT EDUCATION NOTE    Mr. Brett Frye Jr. was seen in LISTED kidney transplant clinic for evaluation for kidney, kidney/pancreas or pancreas only transplant.  The patient attended a group education session that discussed/reviewed the following aspects of transplantation: evaluation and selection committee process, UNOS waitlist management/multiple listings, types of organs offered (KDPI < 85%, KDPI > 85%, PHS increased risk, DCD, HCV+), financial aspects, surgical procedures, dietary instruction pre- and post-transplant, health maintenance pre- and post-transplant, post-transplant hospitalization and outpatient follow-up, potential to participate in a research protocol, and medication management and side effects.  A question and answer session was provided after the presentation.    The patient was seen by all members of the multi-disciplinary team to include: Nephrologist/PA, Surgeon, , Transplant Coordinator, , Pharmacist and Dietician (if applicable).    The patient reviewed and signed all consents for evaluation which were witnessed and sent to scanning into the EPIC chart.    The patient was given an education book and plan for further evaluation based on his individual assessment.      The patient was encouraged to call with any questions or concerns.

## 2019-07-05 NOTE — PROGRESS NOTES
PHARM.D. PRE-TRANSPLANT NOTE:    This patient's medication therapy was evaluated as part of his pre-transplant evaluation.      The following general pharmacologic concerns were noted: N/A    The following pharmacologic concerns related to HCV therapy were noted: Atorvastatin interacts with DAA therefore should transition to Rosuvastatin      This patient's medication profile was reviewed for contraindications for DAA Hepatitis C therapy:    [x]  No current inducers of CYP 3A4 or PGP  [x]  No amiodarone on this patient's EMR profile in the last 24 months  [x]  No past or current atrial fibrillation on this patient's EMR profile       Current Outpatient Medications   Medication Sig Dispense Refill    allopurinol (ZYLOPRIM) 100 MG tablet Take 100 mg by mouth once daily.      amlodipine (NORVASC) 10 MG tablet Take 10 mg by mouth once daily.      atorvastatin (LIPITOR) 10 MG tablet Take 10 mg by mouth every evening.       B complex-vitamin C-folic acid 0.8 mg Tab Take 1 tablet by mouth once daily.      calcitRIOL (ROCALTROL) 0.25 MCG Cap Take 0.25 mcg by mouth once daily.      carvedilol (COREG) 6.25 MG tablet Take 6.25 mg by mouth 2 (two) times daily.      doxazosin (CARDURA) 1 MG tablet Take 1 mg by mouth once daily.       FERRIC CITRATE (AURYXIA ORAL) Take 2 tablets by mouth 3 (three) times daily.      ferric citrate 210 mg iron Tab Take 210 mg by mouth 3 (three) times daily.      furosemide (LASIX) 80 MG tablet Take 80 mg by mouth once daily.      hydrALAZINE (APRESOLINE) 25 MG tablet Take 25 mg by mouth 3 (three) times daily.      hydrocodone-acetaminophen 5-325mg (NORCO) 5-325 mg per tablet Take 1 tablet by mouth every 6 (six) hours as needed for Pain. 15 tablet 0    isosorbide dinitrate (ISORDIL) 20 MG tablet Take 20 mg by mouth 3 (three) times daily.      k phos di & mono-sod phos mono (K-PHOS-NEUTRAL) 250 mg Tab Take 500 mg by mouth 3 (three) times daily after meals.      lanthanum (FOSRENOL)  1000 MG chewable tablet Take 1,000 mg by mouth 3 (three) times daily.      multivitamin with minerals tablet Take 1 tablet by mouth once daily.      ramipril (ALTACE) 5 MG capsule Take 1 capsule (5 mg total) by mouth once daily. 90 capsule 3    sevelamer carbonate (RENVELA) 800 mg Tab Take 800 mg by mouth 3 (three) times daily with meals.      sodium bicarbonate 650 MG tablet Take 650 mg by mouth 2 (two) times daily.       No current facility-administered medications for this visit.          Currently Mr. Frye is responsible for preparing / administering this patient's medications on a daily basis.  I am available for consultation and can be contacted, as needed by the other members of the Kidney Transplant team.

## 2019-07-10 NOTE — PROGRESS NOTES
Patient's chart reviewed today. Patient due for follow-up in July 2020. Appointments will be scheduled per protocol. HLA sample current, in lab, and being received on a regular basis.

## 2019-07-22 NOTE — LETTER
July 22, 2019    Brett Frye  [address] [facility] [address]    Dear Brett Frye:  MRN: [id number]    The [facility] transplant team reviewed your transplant candidacy.  It is our programs opinion that you are a transplant candidate and are re-activated at our facility as of 7/22/19.  You are now eligible to receive a transplant.  Your status is now Status 1.    Attached is a letter from the United Network for Organ Sharing (UNOS).  It describes the services and information offered to patients by UNOS and the Organ Procurement and Transplant Network.    The [facility] transplant team remains available to answer any questions.  Should you have any questions regarding this decision, please do not hesitate to contact our pre-transplant office.      Sincerely,    RUBY PrestonN, RN, MPH  Pre-Kidney Coordinators    [facility]  [address] [place] [id number]  [phone]  Lh/Enclosure    Faxed to:  Susie Patel MD                   [facility]       OPTN/UNOS: Your Resource for Organ Transplant Information        If you have a question regarding your own medical care, you always should call your transplant center first. However, for general organ transplant-related information, you can call the [employer] Network for Organ Sharing (UNOS) toll-free patient services line at [phone].    Anyone, including potential transplant candidates, recipients, family members/friends, living donors, and/or donor family members can call this number to:    · talk about organ donation, living donation, how transplant and donation work, the donation process, transplant policies, and transplant/donor information;  · get a free patient information kit with helpful booklets, waiting list and transplant information, and a list of all transplant centers;  · ask questions about the Organ Procurement and Transplantation Network (OPTN) web site (www.optn.transplant.hrsa.gov); the  UNOS Web site (www.unos.org); or the UNOS web site for living donors and transplant recipients (www.transplantliving.org);  · learn how UNOS and the OPTN can help you;  · talk about any concerns that you may have with a transplant center and how they perform    UNOS is a not-for-profit organization that provides all of the administrative services for the national OPTN under federal contract to the Health Resources and Services Administration (HRSA), an agency under the U.S. Department of Health and Human Services (HHS).     UNOS and OPTN responsibilities include:    · writing educational material for patients, the public and professionals;  · helping to make people aware of the need for donated organs and tissue;  · writing organ transplant policy with help from doctors, nurses, transplant patients/candidates, donor families, living donors, and the public;  · coordinating the organ matching and placement process;  · collecting information about every organ transplant and donation that occurs in the [place].    Remember, you should contact your transplant center directly if you have questions or concerns about your own medical care including medical records, work-up progress and test reports. [facility] is not your transplant center, and staff at [facility] will not be able to transfer you to your transplant center, so keep your transplant centers phone number handy. But, while you research your transplant needs and learn as much as you can about transplantation and donation, we welcome your call to our toll-free patient services line at [phone].

## 2019-07-22 NOTE — PROGRESS NOTES
Brett Frye medical records reviewed with . Provider determined that  patient will be re-activated on the waitlist at this time.       Brett Frye notified that he has been re-activated on the waitlist. Patients dialysis unit and Nephrologist notified of status change by letter.

## 2019-07-26 ENCOUNTER — TELEPHONE (OUTPATIENT)
Dept: TRANSPLANT | Facility: CLINIC | Age: 49
End: 2019-07-26

## 2019-07-26 NOTE — PROGRESS NOTES
Brett Frye medical records reviewed with . Provider determined that due to having potential safety issues that the patient will be made inactive on waitlist at this time.       Brett Frye notified of safety concerns related to transplant at this time. Patients dialysis unit and Nephrologist notified of above concerns by letter.

## 2019-07-26 NOTE — LETTER
July 26, 2019    Brett Frye  [address] [facility] [address]    Dear Brett Frye:  MRN: [id number]    The [facility] team reviewed your transplant candidacy.  It is our programs opinion that you are temporarily not a transplant candidate at our facility as of 7/26/19 because of tooth abscess requiring further attention.  You will remain listed on the wait list, but will not be able to receive a transplant until this issue is resolved.      Attached is a letter from the United Network for Organ Sharing (UNOS).  It describes the services and information offered to patients by UNOS and the Organ Procurement and Transplant Network.    The [facility] team remains available to answer any questions.  Should you have any questions regarding this decision, please do not hesitate to contact our pre-transplant office.      Sincerely,        Halle Billy MD  Medical Director, Kidney & Kidney/Pancreas Transplantation  /[facility].    Faxed to:  Dr. Susie Patel                   [facility]              OPTN/UNOS: Your Resource for Organ Transplant Information        If you have a question regarding your own medical care, you always should call your transplant center first. However, for general organ transplant-related information, you can call the United Network for Organ Sharing (UNOS) toll-free patient services line at [phone].    Anyone, including potential transplant candidates, recipients, family members/friends, living donors, and/or donor family members can call this number to:    · talk about organ donation, living donation, how transplant and donation work, the donation process, transplant policies, and transplant/donor information;  · get a free patient information kit with helpful booklets, waiting list and transplant information, and a list of all transplant centers;  · ask questions about the Organ Procurement and Transplantation Network (OPTN) web site  (www.optn.transplant.hrsa.gov); the UNOS Web site (www.unos.org); or the UNOS web site for living donors and transplant recipients (www.transplantliving.org);  · learn how UNOS and the OPTN can help you;  · talk about any concerns that you may have with a transplant center and how they perform    UNOS is a not-for-profit organization that provides all of the administrative services for the national OPTN under federal contract to the Health Resources and Services Administration (HRSA), an agency under the U.S. Department of Health and Human Services (HHS).     UNOS and OPTN responsibilities include:    · writing educational material for patients, the public and professionals;  · helping to make people aware of the need for donated organs and tissue;  · writing organ transplant policy with help from doctors, nurses, transplant patients/candidates, donor families, living donors, and the public;  · coordinating the organ matching and placement process;  · collecting information about every organ transplant and donation that occurs in the [place].    Remember, you should contact your transplant center directly if you have questions or concerns about your own medical care including medical records, work-up progress and test reports. [facility] is not your transplant center, and staff at [facility] will not be able to transfer you to your transplant center, so keep your transplant centers phone number handy. But, while you research your transplant needs and learn as much as you can about transplantation and donation, we welcome your call to our toll-free patient services line at [phone].

## 2019-07-26 NOTE — TELEPHONE ENCOUNTER
ON-CALL NOTE    UNOS# [id number]    Notified by Merlin Collier, , that Brett Frye is eligible for back-up kidney offer.  Spoke with patient and identified acute medical issues in telephone assessment, pt has abscessed tooth and is in process of getting scheduled for dental surgery. Notified Merlin Collier, .  Patient medically unsuitable for transplant at this time, will notify listed coordinator.  Current sample of blood is available for crossmatch.  Patient reports no sensitizing event since last blood sample for PRA received.

## 2019-07-26 NOTE — TELEPHONE ENCOUNTER
Agree he should be inactive/status 7 until cleared by dentist/ oral surgeon that dental abscess has resolved.

## 2019-07-26 NOTE — TELEPHONE ENCOUNTER
----- Message from Emeka Malave RN sent at 7/26/2019  1:18 PM CDT -----  Regarding: FW: Tooth Abscess- medically unsuitable, passed on b/u offer  Status change to inactive  Pt received organ offer and notified on-call coordinator that he has a tooth abscess and scheduled for surgery in a few weeks.   Ok to inactivate?    Thanks  Blanche  ----- Message -----  From: Cande Saenz RN  Sent: 7/26/2019   9:49 AM  To: Emeka Malave RN  Subject: Tooth Abscess- medically unsuitable, passed #    Notified by Merlin Collier, , that Brett Frye is eligible for back-up kidney offer.  Spoke with patient and identified acute medical issues in telephone assessment, pt has abscessed tooth and is in process of getting scheduled for dental surgery. Notified Merlin Collier, .  Patient medically unsuitable for transplant at this time.

## 2019-08-12 DIAGNOSIS — I42.0 DILATED CARDIOMYOPATHY: ICD-10-CM

## 2019-08-12 DIAGNOSIS — I10 ESSENTIAL HYPERTENSION: Chronic | ICD-10-CM

## 2019-08-12 DIAGNOSIS — Z99.2 ESRD ON PERITONEAL DIALYSIS: Chronic | ICD-10-CM

## 2019-08-12 DIAGNOSIS — E78.00 PURE HYPERCHOLESTEROLEMIA: ICD-10-CM

## 2019-08-12 DIAGNOSIS — N18.6 ESRD ON PERITONEAL DIALYSIS: Chronic | ICD-10-CM

## 2019-08-12 RX ORDER — RAMIPRIL 5 MG/1
CAPSULE ORAL
Qty: 90 CAPSULE | Refills: 3 | Status: SHIPPED | OUTPATIENT
Start: 2019-08-12 | End: 2023-06-06

## 2019-09-04 NOTE — PROGRESS NOTES
Spoke w/pt who stated he has not addressed issues with mouth and will schedule appt. Pt is aware that he will remain inactive until resolved.

## 2020-05-08 DIAGNOSIS — Z76.82 ORGAN TRANSPLANT CANDIDATE: Primary | ICD-10-CM

## 2020-05-14 ENCOUNTER — TELEPHONE (OUTPATIENT)
Dept: TRANSPLANT | Facility: CLINIC | Age: 50
End: 2020-05-14

## 2020-08-11 ENCOUNTER — TELEPHONE (OUTPATIENT)
Dept: TRANSPLANT | Facility: CLINIC | Age: 50
End: 2020-08-11

## 2020-08-12 ENCOUNTER — TELEPHONE (OUTPATIENT)
Dept: TRANSPLANT | Facility: CLINIC | Age: 50
End: 2020-08-12

## 2020-11-02 NOTE — PROGRESS NOTES
Called pt to confirm appt tomorrow and to verify if tooth abscess was addressed. Pt was not available, LM with father.

## 2020-11-06 ENCOUNTER — TELEPHONE (OUTPATIENT)
Dept: TRANSPLANT | Facility: CLINIC | Age: 50
End: 2020-11-06

## 2021-01-07 ENCOUNTER — HOSPITAL ENCOUNTER (OUTPATIENT)
Dept: CARDIOLOGY | Facility: HOSPITAL | Age: 51
Discharge: HOME | End: 2021-01-07
Attending: NURSE PRACTITIONER
Payer: MEDICARE

## 2021-01-07 ENCOUNTER — HOSPITAL ENCOUNTER (OUTPATIENT)
Dept: RADIOLOGY | Facility: HOSPITAL | Age: 51
Discharge: HOME | End: 2021-01-07
Attending: NURSE PRACTITIONER
Payer: MEDICARE

## 2021-01-07 ENCOUNTER — TELEPHONE (OUTPATIENT)
Dept: TRANSPLANT | Facility: CLINIC | Age: 51
End: 2021-01-07

## 2021-01-07 VITALS — HEIGHT: 69 IN | WEIGHT: 140 LBS | BODY MASS INDEX: 20.73 KG/M2

## 2021-01-07 DIAGNOSIS — Z76.82 ORGAN TRANSPLANT CANDIDATE: ICD-10-CM

## 2021-01-07 LAB
ASCENDING AORTA: 3.41 CM
BSA FOR ECHO PROCEDURE: 1.76 M2
CV ECHO LV RWT: 0.41 CM
CV STRESS BASE HR: 66 BPM
DIASTOLIC BLOOD PRESSURE: 75 MMHG
DOP CALC LVOT AREA: 3.8 CM2
DOP CALC LVOT DIAMETER: 2.21 CM
DOP CALC LVOT PEAK VEL: 0.91 M/S
DOP CALC LVOT STROKE VOLUME: 75.95 CM3
DOP CALCLVOT PEAK VEL VTI: 19.81 CM
E WAVE DECELERATION TIME: 151.34 MSEC
E/A RATIO: 1.25
E/E' RATIO: 11.38 M/S
ECHO LV POSTERIOR WALL: 1.18 CM (ref 0.6–1.1)
FRACTIONAL SHORTENING: 24 % (ref 28–44)
INTERVENTRICULAR SEPTUM: 0.75 CM (ref 0.6–1.1)
LA MAJOR: 4.66 CM
LA MINOR: 4.7 CM
LA WIDTH: 4.51 CM
LEFT ATRIUM SIZE: 4.07 CM
LEFT ATRIUM VOLUME INDEX MOD: 29.7 ML/M2
LEFT ATRIUM VOLUME INDEX: 41.1 ML/M2
LEFT ATRIUM VOLUME MOD: 52.77 CM3
LEFT ATRIUM VOLUME: 73.02 CM3
LEFT INTERNAL DIMENSION IN SYSTOLE: 4.41 CM (ref 2.1–4)
LEFT VENTRICLE DIASTOLIC VOLUME INDEX: 94.57 ML/M2
LEFT VENTRICLE DIASTOLIC VOLUME: 167.87 ML
LEFT VENTRICLE MASS INDEX: 126 G/M2
LEFT VENTRICLE SYSTOLIC VOLUME INDEX: 49.6 ML/M2
LEFT VENTRICLE SYSTOLIC VOLUME: 87.98 ML
LEFT VENTRICULAR INTERNAL DIMENSION IN DIASTOLE: 5.82 CM (ref 3.5–6)
LEFT VENTRICULAR MASS: 223.86 G
LV LATERAL E/E' RATIO: 10.11 M/S
LV SEPTAL E/E' RATIO: 13 M/S
MV A" WAVE DURATION": 13.7 MSEC
MV PEAK A VEL: 0.73 M/S
MV PEAK E VEL: 0.91 M/S
OHS CV CPX 1 MINUTE RECOVERY HEART RATE: 95 BPM
OHS CV CPX 85 PERCENT MAX PREDICTED HEART RATE MALE: 145
OHS CV CPX ESTIMATED METS: 12
OHS CV CPX MAX PREDICTED HEART RATE: 170
OHS CV CPX PATIENT IS FEMALE: 0
OHS CV CPX PATIENT IS MALE: 1
OHS CV CPX PEAK DIASTOLIC BLOOD PRESSURE: 81 MMHG
OHS CV CPX PEAK HEAR RATE: 137 BPM
OHS CV CPX PEAK RATE PRESSURE PRODUCT: ABNORMAL
OHS CV CPX PEAK SYSTOLIC BLOOD PRESSURE: 159 MMHG
OHS CV CPX PERCENT MAX PREDICTED HEART RATE ACHIEVED: 81
OHS CV CPX RATE PRESSURE PRODUCT PRESENTING: 9372
PISA TR MAX VEL: 2.16 M/S
PULM VEIN S/D RATIO: 1.11
PV PEAK D VEL: 0.47 M/S
PV PEAK S VEL: 0.52 M/S
RA MAJOR: 4.05 CM
RA PRESSURE: 3 MMHG
RA WIDTH: 3.31 CM
RIGHT VENTRICULAR END-DIASTOLIC DIMENSION: 3.25 CM
RV TISSUE DOPPLER FREE WALL SYSTOLIC VELOCITY 1 (APICAL 4 CHAMBER VIEW): 18.89 CM/S
SINUS: 3.1 CM
STJ: 3.68 CM
STRESS ECHO POST EXERCISE DUR MIN: 7 MINUTES
STRESS ECHO POST EXERCISE DUR SEC: 0 SECONDS
SYSTOLIC BLOOD PRESSURE: 142 MMHG
TDI LATERAL: 0.09 M/S
TDI SEPTAL: 0.07 M/S
TDI: 0.08 M/S
TR MAX PG: 19 MMHG
TRICUSPID ANNULAR PLANE SYSTOLIC EXCURSION: 2.82 CM
TV REST PULMONARY ARTERY PRESSURE: 22 MMHG

## 2021-01-07 PROCEDURE — 76770 US EXAM ABDO BACK WALL COMP: CPT | Mod: 26,TXP,, | Performed by: RADIOLOGY

## 2021-01-07 PROCEDURE — 93351 STRESS TTE COMPLETE: CPT | Mod: 26,TXP,, | Performed by: INTERNAL MEDICINE

## 2021-01-07 PROCEDURE — 93351 STRESS TTE COMPLETE: CPT | Mod: TXP

## 2021-01-07 PROCEDURE — 71046 X-RAY EXAM CHEST 2 VIEWS: CPT | Mod: TC,TXP

## 2021-01-07 PROCEDURE — 76770 US RETROPERITONEAL COMPLETE: ICD-10-PCS | Mod: 26,TXP,, | Performed by: RADIOLOGY

## 2021-01-07 PROCEDURE — 76770 US EXAM ABDO BACK WALL COMP: CPT | Mod: TC,TXP

## 2021-01-07 PROCEDURE — 71046 XR CHEST PA AND LATERAL: ICD-10-PCS | Mod: 26,TXP,, | Performed by: RADIOLOGY

## 2021-01-07 PROCEDURE — 71046 X-RAY EXAM CHEST 2 VIEWS: CPT | Mod: 26,TXP,, | Performed by: RADIOLOGY

## 2021-01-07 PROCEDURE — 93351 STRESS ECHO (CUPID ONLY): ICD-10-PCS | Mod: 26,TXP,, | Performed by: INTERNAL MEDICINE

## 2021-01-08 ENCOUNTER — TELEPHONE (OUTPATIENT)
Dept: TRANSPLANT | Facility: CLINIC | Age: 51
End: 2021-01-08

## 2021-01-13 ENCOUNTER — OFFICE VISIT (OUTPATIENT)
Dept: TRANSPLANT | Facility: CLINIC | Age: 51
End: 2021-01-13
Payer: MEDICARE

## 2021-01-13 VITALS
RESPIRATION RATE: 18 BRPM | HEIGHT: 69 IN | DIASTOLIC BLOOD PRESSURE: 87 MMHG | BODY MASS INDEX: 20.76 KG/M2 | WEIGHT: 140.19 LBS | OXYGEN SATURATION: 100 % | TEMPERATURE: 99 F | HEART RATE: 70 BPM | SYSTOLIC BLOOD PRESSURE: 146 MMHG

## 2021-01-13 DIAGNOSIS — E78.00 PURE HYPERCHOLESTEROLEMIA: ICD-10-CM

## 2021-01-13 DIAGNOSIS — I42.0 DILATED CARDIOMYOPATHY: ICD-10-CM

## 2021-01-13 DIAGNOSIS — I10 ESSENTIAL HYPERTENSION: Chronic | ICD-10-CM

## 2021-01-13 DIAGNOSIS — N18.6 ESRD (END STAGE RENAL DISEASE): Primary | ICD-10-CM

## 2021-01-13 DIAGNOSIS — N25.81 SECONDARY HYPERPARATHYROIDISM OF RENAL ORIGIN: Chronic | ICD-10-CM

## 2021-01-13 DIAGNOSIS — N18.9 ANEMIA OF RENAL DISEASE: Chronic | ICD-10-CM

## 2021-01-13 DIAGNOSIS — Z76.82 PATIENT ON WAITING LIST FOR KIDNEY TRANSPLANT: ICD-10-CM

## 2021-01-13 DIAGNOSIS — D63.1 ANEMIA OF RENAL DISEASE: Chronic | ICD-10-CM

## 2021-01-13 PROCEDURE — 99999 PR PBB SHADOW E&M-EST. PATIENT-LVL IV: CPT | Mod: PBBFAC,TXP,, | Performed by: NURSE PRACTITIONER

## 2021-01-13 PROCEDURE — 99999 PR PBB SHADOW E&M-EST. PATIENT-LVL IV: ICD-10-PCS | Mod: PBBFAC,TXP,, | Performed by: NURSE PRACTITIONER

## 2021-01-13 PROCEDURE — 99215 PR OFFICE/OUTPT VISIT, EST, LEVL V, 40-54 MIN: ICD-10-PCS | Mod: S$PBB,TXP,, | Performed by: NURSE PRACTITIONER

## 2021-01-13 PROCEDURE — 99215 OFFICE O/P EST HI 40 MIN: CPT | Mod: S$PBB,TXP,, | Performed by: NURSE PRACTITIONER

## 2021-01-13 PROCEDURE — 99214 OFFICE O/P EST MOD 30 MIN: CPT | Mod: PBBFAC,TXP | Performed by: NURSE PRACTITIONER

## 2021-01-27 ENCOUNTER — TELEPHONE (OUTPATIENT)
Dept: TRANSPLANT | Facility: CLINIC | Age: 51
End: 2021-01-27

## 2021-02-03 ENCOUNTER — TELEPHONE (OUTPATIENT)
Dept: TRANSPLANT | Facility: CLINIC | Age: 51
End: 2021-02-03

## 2021-02-10 ENCOUNTER — SOCIAL WORK (OUTPATIENT)
Dept: TRANSPLANT | Facility: CLINIC | Age: 51
End: 2021-02-10

## 2021-02-25 ENCOUNTER — TELEPHONE (OUTPATIENT)
Dept: TRANSPLANT | Facility: CLINIC | Age: 51
End: 2021-02-25

## 2021-07-15 DIAGNOSIS — Z76.82 ORGAN TRANSPLANT CANDIDATE: Primary | ICD-10-CM

## 2022-05-06 NOTE — LETTER
May 6, 2022    Brett Frye  [address] [facility] [address]    Dear Brett Frye:  MRN: [id number]    Your were placed on the [facility] Transplant Waiting list on 9/8/16. On 7/26/19, you were made inactive on the waiting list due to tooth abscess requiring surgery, updated colonoscopy and cardiology consult. We have made several unsuccessful attempts to contact you regarding your transplant candidacy. At this time, we are unsure if you are still interested in receiving a kidney transplant. In order to remain on the UNOS registry for kidney transplantation and continue to accumulate waiting time, you must contact our office as soon as possible.    Please contact the transplant center at [phone] and request to speak with your assigned listed transplant coordinator.     If you are still interested, you will be required to update your transplant evaluation prior to reactivation on the transplant waiting list.    If we do not hear from you within 30 days, we will presume that you are no longer interested in pursuing kidney transplantation and will proceed to remove your name from the UNOS waiting list at that time.    Thank you  ROWENA Jones/Enclosed    Cc:Susie Patel MD

## 2022-05-06 NOTE — PROGRESS NOTES
Pt returned call stating he has an appt scheduled 7/25/22 for oral surgery. Pt is also aware that he will need to schedule colonoscopy and cardiology consult and has agreed to schedule these appts locally. Pt is aware that he has 90 days to complete to be considered for re-activation.

## 2022-05-06 NOTE — PROGRESS NOTES
Reviewed pt's chart with Dr. Vazquez. Pt has been inactive since 7/26/19 due to tooth abscess that required surgery. When I spoke with pt on 5/4/21, he reported that he had not addressed his dental issues yet. He is also outstanding for a colonoscopy and cardiology consult due to drop in EF from 55% to 45%. Pt has not followed up. I have left several msgs for pt to return call with no response. I will send another 30-day interest letter to pt. If pt responds, it has been decided that he has 90 days to schedule appts if he is still interested in remaining on the kidney transplant waitlist.

## 2022-08-15 NOTE — PROGRESS NOTES
LM for pt to return call regarding updates on scheduling or completing the following appts:    Dental for tooth abscess. Pt stated he was schedule for extractions 7/2022,  Colonoscopy  Cardiology consult 2ry drop in EF from 55 to 45%    Chart reviewed with Dr. Vazquez 5/2022. Recommendation communicated to pt was that he had 90 days to schedule appts.

## 2022-08-16 NOTE — PROGRESS NOTES
Reviewed & discussed pt's chart with Dr. Vazquez. Pt was advised he needed dental clr since 2019, cards clr & colonoscopy; he had 90 days to schedule. Attempted to call pt, LM, no return call. Pt will be removed from the kidney txp [facility]. He can be re-referred with all of the above completed. Pt to be presented to the selection committee for removal.

## 2022-08-19 ENCOUNTER — COMMITTEE REVIEW (OUTPATIENT)
Dept: TRANSPLANT | Facility: CLINIC | Age: 52
End: 2022-08-19
Payer: MEDICARE

## 2022-08-19 NOTE — LETTER
August 19, 2022    Brett Frye  [address] [address]    Dear Brettferoz Frye:  MRN: [id number]    It is the duty of the [facility] Selection [facility] to determine which patients are candidates for a transplant. For this reason, our committee has the difficult task of evaluating patients to determine which ones have the greatest chance of having a successful transplant. We are aware of the magnitude of this responsibility, and we approach it with reverence and humility.    Your current health status was reviewed at a recent selection committee meeting.  It is with regret I inform you that you are no longer a suitable transplant candidate because of needing dental clearance due to tooth abscess, cards clearance due to drop in EF, and colonoscopy due.  You can be re-referred for evaluation after you have completed all of the above.  Your name has been removed from the wait list effective 8/19/22.    The [facility] carefully considers each patient's transplant candidacy and determines whether it is safe to proceed with transplantation on a case-by-case basis using established selection criteria.  In the past, you were considered to be a suitable transplant candidate.  At present, the risk of proceeding with an elective transplant surgery has become too high.                                                                                                 Although the selection committee believes you are not a suitable transplant candidate, you have the option to be evaluated at other transplant centers.  You may request your [facility] records be sent to any center of your choice by contacting our Medical Records Department at [phone].                                                                               Attached is a letter from the [employer] ([employer]).  It describes the services and information offered to patients by [employer] and the Organ  Procurement and Transplant Network.                                                                                                                                      The [facility] sincerely wishes you the best and remains available to answer any questions.  Please do not hesitate to contact our pre-transplant office if we can assist you in any other way.                                                                               Sincerely,      Halle Billy MD  Medical Director, Kidney & Kidney/Pancreas Transplantation    Tj/Enclosed    Cc: [facility]             The Organ Procurement and Transplantation Network   Toll-free patient services line: Your resource for organ transplant information     If you have a question regarding your own medical care, you always should call your transplant hospital first. However, for general organ transplant-related information, you can call the Organ Procurement and Transplantation Network (OPTN) toll-free patient services line at [phone].     Anyone, including potential transplant candidates, candidates, recipients, family members, friends, living donors, and donor family members, can call this number to:     · Talk about organ donation, living donation, the transplant process, the donation process, and transplant policies.   · Get a free patient information kit with helpful booklets, waiting list and transplant information, and a list of all transplant hospitals.   · Ask questions about the OPTN website (https://optn.transplant.hrsa.gov/), the [employer]s (UNOS) website (https://unos.org/), or the [employer] website for living donors and transplant recipients. (https://www.transplantliving.org/).   · Learn how the OPTN can help you.   · Talk about any concerns that you may have with a transplant hospital.     The nations transplant system, the OPTN, is managed under federal contract by the [employer]  [employer] ([employer]), which is a non-profit charitable organization. The OPTN helps create and define organ sharing policies that make the best use of donated organs. This process continuously evaluating new advances and discoveries so policies can be adapted to best serve patients waiting for transplants. To do so, the OPTN works closely with transplant professionals, transplant patients, transplant candidates, donor families, living donors, and the public. All transplant programs and organ procurement organizations throughout the country are OPTN members and are obligated to follow the policies the OPTN creates for allocating organs.     The OPTN also is responsible for:   · Providing educational material for patients, the public, and professionals.   · Raising awareness of the need for donated organs and tissue.   · Coordinating organ procurement, matching, and placement.   · Collecting information about every organ transplant and donation that occurs in the [place].     Remember, you should contact your transplant hospital directly if you have questions or concerns about your own medical care including medical records, work-up progress, and test results.     We are not your transplant hospital, and our staff will not be able to answer questions about your case, so please keep your transplant hospitals phone number handy.   However, while you research your transplant needs and learn as much as you can about transplantation and donation, we welcome your call to our toll-free patient services line at [phone].

## 2022-08-19 NOTE — PROGRESS NOTES
The above pt has now been removed from the kidney transplant waiting list due to needing dental clr d/t tooth abscess, cards clr d/t drop in EF, and colonoscopy due.  Pt has been inactive since 7/26/19 and has not addressed any of the above.  Pt info updated in [facility] & [place].  Letter request in Epic.    LM for pt to return call regarding removal status.

## 2022-08-19 NOTE — COMMITTEE REVIEW
Native Organ Dx: Hypertensive Nephrosclerosis      Not approved for LRD/CAD transplant due to needing dental clearance due to tooth abscess which was never addressed, cardiology clearance due to drop in EF 55% to 45%, and colonoscopy due. Patient has been inactive since 7/26/19, none of the above have been addressed.      Note written by Emeka Malave RN    ===============================================

## 2024-01-18 PROBLEM — I47.10 SVT (SUPRAVENTRICULAR TACHYCARDIA): Status: ACTIVE | Noted: 2024-01-18
